# Patient Record
Sex: MALE | Race: WHITE | Employment: UNEMPLOYED | URBAN - METROPOLITAN AREA
[De-identification: names, ages, dates, MRNs, and addresses within clinical notes are randomized per-mention and may not be internally consistent; named-entity substitution may affect disease eponyms.]

---

## 2018-02-06 ENCOUNTER — OFFICE VISIT (OUTPATIENT)
Dept: FAMILY MEDICINE | Facility: CLINIC | Age: 48
End: 2018-02-06

## 2018-02-06 ENCOUNTER — RADIANT APPOINTMENT (OUTPATIENT)
Dept: ULTRASOUND IMAGING | Facility: CLINIC | Age: 48
End: 2018-02-06
Attending: FAMILY MEDICINE

## 2018-02-06 VITALS
DIASTOLIC BLOOD PRESSURE: 64 MMHG | HEART RATE: 60 BPM | RESPIRATION RATE: 16 BRPM | TEMPERATURE: 97.5 F | OXYGEN SATURATION: 100 % | SYSTOLIC BLOOD PRESSURE: 110 MMHG | WEIGHT: 158.8 LBS | HEIGHT: 67 IN | BODY MASS INDEX: 24.92 KG/M2

## 2018-02-06 DIAGNOSIS — N50.89 SCROTAL MASS: Primary | ICD-10-CM

## 2018-02-06 DIAGNOSIS — R30.0 DYSURIA: ICD-10-CM

## 2018-02-06 DIAGNOSIS — Z23 NEED FOR PROPHYLACTIC VACCINATION AND INOCULATION AGAINST INFLUENZA: ICD-10-CM

## 2018-02-06 DIAGNOSIS — R39.15 URINARY URGENCY: ICD-10-CM

## 2018-02-06 DIAGNOSIS — N50.89 SCROTAL MASS: ICD-10-CM

## 2018-02-06 DIAGNOSIS — F17.200 TOBACCO USE DISORDER: ICD-10-CM

## 2018-02-06 LAB
ALBUMIN UR-MCNC: NEGATIVE MG/DL
APPEARANCE UR: CLEAR
BILIRUB UR QL STRIP: NEGATIVE
COLOR UR AUTO: YELLOW
GLUCOSE UR STRIP-MCNC: NEGATIVE MG/DL
HGB UR QL STRIP: NEGATIVE
KETONES UR STRIP-MCNC: NEGATIVE MG/DL
LEUKOCYTE ESTERASE UR QL STRIP: NEGATIVE
NITRATE UR QL: NEGATIVE
PH UR STRIP: 6.5 PH (ref 5–7)
SOURCE: NORMAL
SP GR UR STRIP: 1.01 (ref 1–1.03)
UROBILINOGEN UR STRIP-ACNC: 0.2 EU/DL (ref 0.2–1)

## 2018-02-06 PROCEDURE — 81003 URINALYSIS AUTO W/O SCOPE: CPT | Performed by: FAMILY MEDICINE

## 2018-02-06 PROCEDURE — 99213 OFFICE O/P EST LOW 20 MIN: CPT | Performed by: FAMILY MEDICINE

## 2018-02-06 PROCEDURE — 76870 US EXAM SCROTUM: CPT | Performed by: RADIOLOGY

## 2018-02-06 ASSESSMENT — ANXIETY QUESTIONNAIRES
4. TROUBLE RELAXING: SEVERAL DAYS
2. NOT BEING ABLE TO STOP OR CONTROL WORRYING: NOT AT ALL
6. BECOMING EASILY ANNOYED OR IRRITABLE: NOT AT ALL
GAD7 TOTAL SCORE: 2
GAD7 TOTAL SCORE: 2
7. FEELING AFRAID AS IF SOMETHING AWFUL MIGHT HAPPEN: NOT AT ALL
7. FEELING AFRAID AS IF SOMETHING AWFUL MIGHT HAPPEN: NOT AT ALL
3. WORRYING TOO MUCH ABOUT DIFFERENT THINGS: SEVERAL DAYS
5. BEING SO RESTLESS THAT IT IS HARD TO SIT STILL: NOT AT ALL
GAD7 TOTAL SCORE: 2
1. FEELING NERVOUS, ANXIOUS, OR ON EDGE: NOT AT ALL

## 2018-02-06 ASSESSMENT — PAIN SCALES - GENERAL: PAINLEVEL: SEVERE PAIN (7)

## 2018-02-06 ASSESSMENT — PATIENT HEALTH QUESTIONNAIRE - PHQ9
10. IF YOU CHECKED OFF ANY PROBLEMS, HOW DIFFICULT HAVE THESE PROBLEMS MADE IT FOR YOU TO DO YOUR WORK, TAKE CARE OF THINGS AT HOME, OR GET ALONG WITH OTHER PEOPLE: NOT DIFFICULT AT ALL
SUM OF ALL RESPONSES TO PHQ QUESTIONS 1-9: 5
SUM OF ALL RESPONSES TO PHQ QUESTIONS 1-9: 5

## 2018-02-06 NOTE — PROGRESS NOTES
SUBJECTIVE:   Matthew Lawler is a 47 year old male who presents to clinic today for the following health issues:      History of Present Illness     Diet:  Other  Frequency of exercise:  None  Taking medications regularly:  Yes  Medication side effects:  Not applicable  Additional concerns today:  No    Concern - Lump in scrotum--Noted the irregularity in the left scrotal sac by chance.  There has been no pain or discomfort.  He is over 10 years post vasectomy which had no complications.  No apparent urinary changes other than a feeling of urgency over the past week and slight burning.  Denies any sexual contact that may have caused STDs.  There is no frequency.     Onset: Just noticed this morning    Description:   Lump in scrotum    Intensity: moderate    Progression of Symptoms:  worsening    Accompanying Signs & Symptoms:  Not painful, having issue with urination    Previous history of similar problem:   None     Precipitating factors:   Worsened by: None    Alleviating factors:  Improved by: None    Therapies Tried and outcome: None, just discovered this morning.   Problem list and histories reviewed & adjusted, as indicated.  Additional history: as documented            ROS:  C: NEGATIVE for fever, chills, change in weight  E: NEGATIVE for vision changes or irritation  E/M: NEGATIVE for ear, mouth and throat problems  R: NEGATIVE for significant cough or SOB  CV: NEGATIVE for chest pain, palpitations or peripheral edema  GI: NEGATIVE for nausea, abdominal pain, heartburn, or change in bowel habits   male : Symptoms as noted above  MUSCULOSKELETAL: Chronic lower back pain, followed through pain management.  N: NEGATIVE for weakness, dizziness or paresthesias  E: NEGATIVE for temperature intolerance, skin/hair changes  H: NEGATIVE for bleeding problems  P: NEGATIVE for changes in mood or affect    OBJECTIVE:                                                    /64  Pulse 60  Temp 97.5  F (36.4  C)  "(Temporal)  Resp 16  Ht 5' 6.73\" (1.695 m)  Wt 158 lb 12.8 oz (72 kg)  SpO2 100%  BMI 25.07 kg/m2  Body mass index is 25.07 kg/(m^2).  GENERAL: alert, active, no distress and cooperative  HENT: ear canals- normal; TMs- normal; Nose- normal; Mouth- no ulcers, no lesions  NECK: no tenderness, no adenopathy, no asymmetry, no masses, no stiffness; thyroid- normal to palpation  RESP: lungs clear to auscultation - no rales, no rhonchi, no wheezes  CV: regular rates and rhythm, normal S1 S2, no S3 or S4 and no murmur, no click or rub -  ABDOMEN: soft, no tenderness, no  hepatosplenomegaly, no masses, normal bowel sounds  MS: extremities- no gross deformities noted, no edema  NEURO: strength and tone- normal, sensory exam- grossly normal, mentation- intact, speech- normal, reflexes- symmetric  - male: testicles- normal, no atrophy, no masses;  no inguinal hernias  - male: There appears to be approximately 1-2 cm smooth mass likely located in the spermatic cord just superior to the testes.  Likely some type of benign cyst.  Ultrasound is pending.  PSYCH: Alert and oriented times 3; speech- coherent , normal rate and volume; able to articulate logical thoughts, able to abstract reason, no tangential thoughts, no hallucinations or delusions, affect- normal    Diagnostic test results:  Diagnostic Test Results:  Results for orders placed or performed in visit on 02/06/18 (from the past 24 hour(s))   *UA reflex to Microscopic   Result Value Ref Range    Color Urine Yellow     Appearance Urine Clear     Glucose Urine Negative NEG^Negative mg/dL    Bilirubin Urine Negative NEG^Negative    Ketones Urine Negative NEG^Negative mg/dL    Specific Gravity Urine 1.015 1.003 - 1.035    Blood Urine Negative NEG^Negative    pH Urine 6.5 5.0 - 7.0 pH    Protein Albumin Urine Negative NEG^Negative mg/dL    Urobilinogen Urine 0.2 0.2 - 1.0 EU/dL    Nitrite Urine Negative NEG^Negative    Leukocyte Esterase Urine Negative NEG^Negative    " Source Midstream Urine         ASSESSMENT/PLAN:                                                    1. Tobacco use disorder  Encouraged to discontinue all tobacco products.  - TOBACCO CESSATION ORDER FOR     2. Need for prophylactic vaccination and inoculation against influenza  Advised to still obtain an influenza vaccination.  Can obtain this at a pharmacy at a reduced cost.    3. Urinary urgency  No indication of infection.  Denies any sexual contact for STDs.  - *UA reflex to Microscopic    4. Dysuria  Negative urine.  Denies any STD potential.  - *UA reflex to Microscopic    5. Scrotal mass  Checking an ultrasound but likely benign.  - US Testicular and Scrotum; Future      Follow up with Provider -Follow-up as needed.  See Patient Instructions    The patient understood the rational for the diagnosis and treatment plan. All questions were answered to best of my ability and the patient's satisfaction.    Note: Chart documentation done in part with Dragon Voice Recognition software. Although reviewed after completion, some word and grammatical errors may remain.  Please consider this when interpreting information in this chart.      Josh Serna MD  [facility]  Answers for HPI/ROS submitted by the patient on 2/6/2018   PHQ-2 Score: 3  If you checked off any problems, how difficult have these problems made it for you to do your work, take care of things at home, or get along with other people?: Not difficult at all  PHQ9 TOTAL SCORE: 5  ISHMAEL 7 TOTAL SCORE: 2

## 2018-02-06 NOTE — MR AVS SNAPSHOT
After Visit Summary   2/6/2018    Matthew Lawler    MRN: [id number]           Patient Information     Date Of Birth          1970        Visit Information        Provider Department      2/6/2018 10:40 AM Josh Serna MD [facility]        Today's Diagnoses     Scrotal mass    -  1    Tobacco use disorder        Need for prophylactic vaccination and inoculation against influenza        Urinary urgency        Dysuria           Follow-ups after your visit        Follow-up notes from your care team     Return if symptoms worsen or fail to improve.      Your next 10 appointments already scheduled     Feb 06, 2018  5:50 PM CST   US TESTICULAR AND SCROTUM with MGUS1, [facility] [place]   [facility]    [id number] [place] [id number] [phone]           Please bring a list of your medicines (including vitamins, minerals and over-the-counter drugs). Also, tell your doctor about any allergies you may have. Wear comfortable clothes and leave your valuables at home.  You do not need to do anything special to prepare for your exam.  Please call the Imaging Department at your exam site with any questions.              Future tests that were ordered for you today     Open Future Orders        Priority Expected Expires Ordered    US Testicular and Scrotum Routine 2/6/2018 3/29/2018 2/6/2018            Who to contact     If you have questions or need follow up information about today's clinic visit or your schedule please contact [facility] directly at [phone].  Normal or non-critical lab and imaging results will be communicated to you by MyChart, letter or phone within 4 business days after the clinic has received the results. If you do not hear from us within 7 days, please contact the clinic through MyChart or phone. If you have a critical or abnormal lab result, we will notify you by phone as soon as  "possible.  Submit refill requests through [employer] or call your pharmacy and they will forward the refill request to us. Please allow 3 business days for your refill to be completed.          Additional Information About Your Visit        Korb[employer]art Information     [employer]eper gives you secure access to your electronic health record. If you see a primary care provider, you can also send messages to your care team and make appointments. If you have questions, please call your primary care clinic.  If you do not have a primary care provider, please call [phone] and they will assist you.        Care EveryWhere ID     This is your Care EveryWhere ID. This could be used by other organizations to access your [place] medical records  [id number]        Your Vitals Were     Pulse Temperature Respirations Height Pulse Oximetry BMI (Body Mass Index)    60 97.5  F (36.4  C) (Temporal) 16 5' 6.73\" (1.695 m) 100% 25.07 kg/m2       Blood Pressure from Last 3 Encounters:   02/06/18 110/64   12/05/16 152/80   10/04/16 108/70    Weight from Last 3 Encounters:   02/06/18 158 lb 12.8 oz (72 kg)   12/05/16 161 lb 1.6 oz (73.1 kg)   10/04/16 163 lb (73.9 kg)              We Performed the Following     *UA reflex to Microscopic     TOBACCO CESSATION ORDER FOR         Primary Care Provider Office Phone # Fax #    Josh Serna [phone] [address] [facility] [address]        Equal Access to Services     [facility] : Hadii aad ku hadasho Soomaali, waaxda luqadaha, qaybta kaalmada adeegyada, wax radha frideman adejeronimo garrido . So [facility] [phone].    ATENCIÓN: Si habla español, tiene a gamboa disposición servicios gratuitos de asistencia lingüística. Llame al [phone].    We comply with applicable federal civil rights laws and [place] laws. We do not discriminate on the basis of race, color, national origin, age, disability, sex, sexual orientation, or gender identity.            Thank you! "     Thank you for choosing [facility]  for your care. Our goal is always to provide you with excellent care. Hearing back from our patients is one way we can continue to improve our services. Please take a few minutes to complete the written survey that you may receive in the mail after your visit with us. Thank you!             Your Updated Medication List - Protect others around you: Learn how to safely use, store and throw away your medicines at www.disposemymeds.org.          This list is accurate as of 2/6/18 11:44 AM.  Always use your most recent med list.                   Brand Name Dispense Instructions for use Diagnosis    * ALPRAZolam 0.5 MG tablet    XANAX    30 tablet    Take 1 tablet (0.5 mg) by mouth nightly as needed for anxiety    Anxiety       * ALPRAZolam 2 MG tablet    XANAX    30 tablet    Take 1 tablet (2 mg) by mouth nightly as needed for sleep    Anxiety       methadone 10 MG tablet    DOLOPHINE    120 tablet    Take 2 tablets (20 mg) by mouth every 12 hours    Chronic pain syndrome       oxyCODONE IR 15 MG tablet    ROXICODONE    90 tablet    Take 1 tablet (15 mg) by mouth 3 times daily    Chronic pain syndrome       * Notice:  This list has 2 medication(s) that are the same as other medications prescribed for you. Read the directions carefully, and ask your doctor or other care provider to review them with you.

## 2018-02-07 ENCOUNTER — TELEPHONE (OUTPATIENT)
Dept: FAMILY MEDICINE | Facility: CLINIC | Age: 48
End: 2018-02-07

## 2018-02-07 DIAGNOSIS — N50.89 SCROTAL MASS: Primary | ICD-10-CM

## 2018-02-07 ASSESSMENT — ANXIETY QUESTIONNAIRES: GAD7 TOTAL SCORE: 2

## 2018-02-07 ASSESSMENT — PATIENT HEALTH QUESTIONNAIRE - PHQ9: SUM OF ALL RESPONSES TO PHQ QUESTIONS 1-9: 5

## 2018-02-08 NOTE — TELEPHONE ENCOUNTER
Matthew was called to inform him of the scrotal ultrasound results.  Radiologist read a heterogeneous mass in the left spermatic cord region.  The lesion was not able to be confirmed as a benign lesion.  Tumor was possible but highly unlikely.    Recommendation is for urologic consultation with Dr. Li in [place].  Referral placed.    Please assist in scheduling with Dr. Li and inform the patient. The patient understood the rational for the diagnosis and treatment plan. All questions were answered to best of my ability and the patient's satisfaction.      Josh Serna MD  Please close encounter when call/work completed.

## 2018-02-15 ENCOUNTER — TELEPHONE (OUTPATIENT)
Dept: UROLOGY | Facility: CLINIC | Age: 48
End: 2018-02-15

## 2018-02-15 NOTE — TELEPHONE ENCOUNTER
This cma is leaving a mychart message for previsit, not able to leave Vmail for previsit.     Amorrae Lisa CMA

## 2018-02-28 ENCOUNTER — OFFICE VISIT (OUTPATIENT)
Dept: UROLOGY | Facility: CLINIC | Age: 48
End: 2018-02-28
Attending: FAMILY MEDICINE

## 2018-02-28 VITALS — HEART RATE: 66 BPM | OXYGEN SATURATION: 98 % | SYSTOLIC BLOOD PRESSURE: 104 MMHG | DIASTOLIC BLOOD PRESSURE: 62 MMHG

## 2018-02-28 DIAGNOSIS — N50.89 SCROTAL MASS: ICD-10-CM

## 2018-02-28 PROCEDURE — 99203 OFFICE O/P NEW LOW 30 MIN: CPT | Performed by: UROLOGY

## 2018-02-28 ASSESSMENT — PAIN SCALES - GENERAL: PAINLEVEL: NO PAIN (0)

## 2018-02-28 NOTE — NURSING NOTE
"Matthew Lawler's goals for this visit include:   Chief Complaint   Patient presents with     Consult     Scrotal lump, left side       He requests these members of his care team be copied on today's visit information: Yes    PCP: Josh Serna    Referring Provider:  Josh Serna MD  [address] [place] [address]    Chief Complaint   Patient presents with     Consult     Scrotal lump, left side       Initial /62 (BP Location: Left arm, Patient Position: Sitting, Cuff Size: Adult Regular)  Pulse 66  SpO2 98% Estimated body mass index is 25.07 kg/(m^2) as calculated from the following:    Height as of 2/6/18: 1.695 m (5' 6.73\").    Weight as of 2/6/18: 72 kg (158 lb 12.8 oz).  Medication Reconciliation: complete    Do you need any medication refills at today's visit? No    "

## 2018-02-28 NOTE — MR AVS SNAPSHOT
After Visit Summary   2/28/2018    Matthew Lawler    MRN: [id number]           Patient Information     Date Of Birth          1970        Visit Information        Provider Department      2/28/2018 2:30 PM Jed Li MD [facility]        Today's Diagnoses     Scrotal mass          Care Instructions    Please call [phone] to schedule ultrasound around 5/28/18.          Follow-ups after your visit        Future tests that were ordered for you today     Open Future Orders        Priority Expected Expires Ordered    US Testicular and Scrotum Routine 5/28/2018 2/28/2019 2/28/2018            Who to contact     If you have questions or need follow up information about today's clinic visit or your schedule please contact [facility] directly at [phone].  Normal or non-critical lab and imaging results will be communicated to you by [employer], letter or phone within 4 business days after the clinic has received the results. If you do not hear from us within 7 days, please contact the clinic through [employer] or phone. If you have a critical or abnormal lab result, we will notify you by phone as soon as possible.  Submit refill requests through [employer] or call your pharmacy and they will forward the refill request to us. Please allow 3 business days for your refill to be completed.          Additional Information About Your Visit        [employer]art Information     [employer] gives you secure access to your electronic health record. If you see a primary care provider, you can also send messages to your care team and make appointments. If you have questions, please call your primary care clinic.  If you do not have a primary care provider, please call [phone] and they will assist you.      [employer] is an electronic gateway that provides easy, online access to your medical records. With [employer], you can request a clinic appointment, read your test results, renew a  prescription or communicate with your care team.     To access your existing account, please contact your [facility] or call [phone] for assistance.        Care EveryWhere ID     This is your Care EveryWhere ID. This could be used by other organizations to access your [facility] medical records  [id number]        Your Vitals Were     Pulse Pulse Oximetry                66 98%           Blood Pressure from Last 3 Encounters:   02/28/18 104/62   02/06/18 110/64   12/05/16 152/80    Weight from Last 3 Encounters:   02/06/18 72 kg (158 lb 12.8 oz)   12/05/16 73.1 kg (161 lb 1.6 oz)   10/04/16 73.9 kg (163 lb)              We Performed the Following     UROLOGY ADULT REFERRAL        Primary Care Provider Office Phone # Fax #    Josh Serna [phone] [address] [facility] [address]        Equal Access to Services     ELZA GRIFFIN : Hadii aad ku hadasho Soomaali, waaxda luqadaha, qaybta kaalmada adeegyada, waxay marcin haycassidyn jesus garrido . So [facility] [phone].    ATENCIÓN: Si habla español, tiene a gamboa disposición servicios gratuitos de asistencia lingüística. Llame al [phone].    We comply with applicable federal civil rights laws and [place] laws. We do not discriminate on the basis of race, color, national origin, age, disability, sex, sexual orientation, or gender identity.            Thank you!     Thank you for choosing [facility]  for your care. Our goal is always to provide you with excellent care. Hearing back from our patients is one way we can continue to improve our services. Please take a few minutes to complete the written survey that you may receive in the mail after your visit with us. Thank you!             Your Updated Medication List - Protect others around you: Learn how to safely use, store and throw away your medicines at www.disposemymeds.org.          This list is accurate as of 2/28/18   2:42 PM.  Always use your most recent med list.                   Brand Name Dispense Instructions for use Diagnosis    methadone 10 MG tablet    DOLOPHINE    120 tablet    Take 2 tablets (20 mg) by mouth every 12 hours    Chronic pain syndrome       oxyCODONE IR 15 MG tablet    ROXICODONE    90 tablet    Take 1 tablet (15 mg) by mouth 3 times daily    Chronic pain syndrome

## 2018-02-28 NOTE — PROGRESS NOTES
I am seeing Matthew Lawler in consultation from Josh Serna  for evaluation of left scrotal mass.    HPI:  Matthew Lawler is a 47 year old male who recently noticed a lump behind the left testis.  This does not hurt but can be sensitive to touch.  He is status-post vasectomy about 2004.  No injury.  No infection.  Otherwise without  complaints.    REVIEW OF SYSTEMS:  General: negative  Skin: negative  Eyes: negative  Ears/Nose/Throat: negative  Respiratory: negative  Cardiovascular: negative  Gastrointestinal: negative  Genitourinary: see HPI  Musculoskeletal: negative  Neurologic: negative  Psychiatric: negative  Hematologic/Lymphatic/Immunologic: negative  Endocrine: negative    PAST MEDICAL HX:  Past Medical History:   Diagnosis Date     Anxiety      Chronic pain      COPD (chronic obstructive pulmonary disease) (H)      Degeneration of lumbar or lumbosacral intervertebral disc     L4/L5 and L5/S1 --work injury       PAST SURG HX:  Past Surgical History:   Procedure Laterality Date     INJECT EPIDURAL LUMBAR  06/16/2014    [facility]     SURGICAL HISTORY OF -       Bilateral carpal tunnel release     SURGICAL HISTORY OF -       Tendon repair right elbow        FAMILY HX:  Family History   Problem Relation Age of Onset     CANCER Father      bladder     Asthma No family hx of      C.A.D. No family hx of      DIABETES No family hx of      Hypertension No family hx of      CEREBROVASCULAR DISEASE No family hx of      Breast Cancer No family hx of      Cancer - colorectal No family hx of      Prostate Cancer No family hx of      Alcohol/Drug No family hx of      Anesthesia Reaction No family hx of      Arthritis No family hx of      Alzheimer Disease No family hx of      Allergies No family hx of      Blood Disease No family hx of      Cardiovascular No family hx of      Circulatory No family hx of      Congenital Anomalies No family hx of      Connective Tissue Disorder No family hx of      Endocrine  Disease No family hx of      Depression No family hx of      Eye Disorder No family hx of      Genetic Disorder No family hx of      GASTROINTESTINAL DISEASE No family hx of      Genitourinary Problems No family hx of      Gynecology No family hx of      HEART DISEASE No family hx of      Lipids No family hx of      Musculoskeletal Disorder No family hx of      Neurologic Disorder No family hx of      Obesity No family hx of      OSTEOPOROSIS No family hx of      Psychotic Disorder No family hx of      Respiratory No family hx of      Thyroid Disease No family hx of      Hearing Loss No family hx of      Family History Negative No family hx of      Coronary Artery Disease No family hx of      Hyperlipidemia No family hx of      Ovarian Cancer No family hx of      Depression/Anxiety No family hx of      Thyroid Disease No family hx of      Chemical Addiction No family hx of      Known Genetic Syndrome No family hx of      Anxiety Disorder No family hx of      MENTAL ILLNESS No family hx of      Substance Abuse No family hx of      Other Cancer No family hx of      Colon Cancer No family hx of        SOCIAL HX:  Social History   Substance Use Topics     Smoking status: Current Every Day Smoker     Packs/day: 0.50     Years: 19.00     Types: Cigarettes     Smokeless tobacco: Never Used      Comment: 1/2 pack per day     Alcohol use No       MEDICATIONS:  Current Outpatient Prescriptions   Medication Sig     methadone (DOLOPHINE) 10 MG tablet Take 2 tablets (20 mg) by mouth every 12 hours     oxyCODONE (ROXICODONE) 15 MG immediate release tablet Take 1 tablet (15 mg) by mouth 3 times daily     No current facility-administered medications for this visit.        ALLERGIES:  Penicillins and Morphine hcl      GENERAL PHYSICAL EXAM:     /62 (BP Location: Left arm, Patient Position: Sitting, Cuff Size: Adult Regular)  Pulse 66  SpO2 98%   Constitutional: No acute distress. Well nourished.   PSYCH: normal mood and  affect.  NEURO: normal gait, no focal deficits.   EYES: anicteric, EOMI, PERR.  CARDIOPULMONARY: breathing non-labored, pulse regular rate/rhythm, no peripheral edema.  GI: Abdomen soft, nondistended   MUSCULOSKELETAL: normal limb proportions, no muscle wasting, no contractures.  SKIN: Normal virilized hair distribution, no lesions, warts or rashes over genitalia, abdomen extremities or face.  HEME/LYMPH: no ecchymosis, no lymphadenopathy in groin, no lymphedema.     EXAM:  Phallus uncircumcised,   Left testis descended , size is 15 , consistency is slightly soft . No intra-testicular masses.   Right testis descended , size is 15 , consistency is slightly soft . No intra-testicular masses.   Right epididymis present, non-tender, slightly indurated consistent with post vasectomy.  Left epididymis present, slightly indurated in body, nontender.  There is a 8mm mass near the left epididymis tail.  This is slightly tender to palpation.   Cord structures not remarkable.     Prostate exam: deferred     Imaging/labs:  Lab Results   Component Value Date    CR 0.99 09/28/2015       ASSESSMENT:     Left epididymis mass.  Differential diagnosis is sperm granuloma or less likely adenomatoid tumor.  Discussed that malignancy is possible but very rare to see in the epididymis.    PLAN:  Orders Placed This Encounter     US Testicular and Scrotum         Advised follow-up with  scrotal ultrasound in 3 months and return to clinic to discuss results in depth.      Copied cc to Consulting provider Josh Serna        Thank-you for the kind consultation.  Jed Li MD     Urological Surgeon

## 2019-03-11 ENCOUNTER — TELEPHONE (OUTPATIENT)
Dept: UROLOGY | Facility: CLINIC | Age: 49
End: 2019-03-11

## 2019-03-11 NOTE — TELEPHONE ENCOUNTER
Received alert that patient is overdue for testicular ultrasound as recommended by Dr. Li on 2/28/18. Attempted to reach patient by phone, but no answer. Left generic message with request for patient to return call to clinic. When patient returns call, follow up testicular ultrasound can be scheduled with imaging.    Rupal Mackey RN, BSN

## 2019-03-15 NOTE — TELEPHONE ENCOUNTER
Attempted to reach patient by phone, but no answer and unable to leave message as voicemail box is full. Will continue to try to reach patient.    Rupal Mackey RN, BSN

## 2019-03-19 NOTE — TELEPHONE ENCOUNTER
Attempted to reach patient by phone, but no answer and unable to leave message as voicemail box is full. Letter mailed to patient's home address on file.    Rupal Mackey RN, BSN

## 2019-04-03 ENCOUNTER — MYC REFILL (OUTPATIENT)
Dept: FAMILY MEDICINE | Facility: CLINIC | Age: 49
End: 2019-04-03

## 2019-04-03 DIAGNOSIS — G89.4 CHRONIC PAIN SYNDROME: ICD-10-CM

## 2019-04-03 RX ORDER — METHADONE HYDROCHLORIDE 10 MG/1
20 TABLET ORAL EVERY 12 HOURS
Qty: 120 TABLET | Refills: 0 | Status: CANCELLED | OUTPATIENT
Start: 2019-04-03

## 2019-04-03 NOTE — TELEPHONE ENCOUNTER
Reviewed chart and reviewed . It has been over a year since he was in this clinic and three years since this medication has been filled here.  He will need to schedule an appointment.

## 2019-04-04 ENCOUNTER — MYC REFILL (OUTPATIENT)
Dept: FAMILY MEDICINE | Facility: CLINIC | Age: 49
End: 2019-04-04

## 2019-04-04 DIAGNOSIS — G89.4 CHRONIC PAIN SYNDROME: ICD-10-CM

## 2019-04-04 NOTE — TELEPHONE ENCOUNTER
Please see messages below. Patient is reporting symptoms of withdrawal.      RN please triage. May need ED evaluation.

## 2019-04-04 NOTE — TELEPHONE ENCOUNTER
(Pt sent separate [employer] message back - copy/pasted here to continue conversation)    Hi I'm replying to the message regarding medication, I called [facility] and they dont have an appointment for two weeks.  I'm already having withdrawal symptoms,  is there anything you can do to get me in?

## 2019-04-08 RX ORDER — METHADONE HYDROCHLORIDE 10 MG/1
20 TABLET ORAL EVERY 12 HOURS
Qty: 120 TABLET | Refills: 0 | OUTPATIENT
Start: 2019-04-08

## 2019-04-08 NOTE — TELEPHONE ENCOUNTER
Requested Prescriptions   Pending Prescriptions Disp Refills     methadone (DOLOPHINE) 10 MG tablet 120 tablet 0     Sig: Take 2 tablets (20 mg) by mouth every 12 hours       There is no refill protocol information for this order        Last ov 02/06/2018    Routing refill request to provider for review/approval because:  Drug not on the [facility] refill protocol   Patient needs to be seen because it has been more than 1 year since last office visit.

## 2019-07-01 ENCOUNTER — TELEPHONE (OUTPATIENT)
Dept: UROLOGY | Facility: CLINIC | Age: 49
End: 2019-07-01

## 2019-07-01 NOTE — TELEPHONE ENCOUNTER
Left message for patient to return call to clinic. Needs to get scheduled and is overdue for his testicular and scrotal ultrasound per Dr Li. Awaiting patient 's response. Mansi Abdullahi RN

## 2019-07-02 NOTE — TELEPHONE ENCOUNTER
Attempted to contact pt multiple times to schedule overdue testicular ultrasound with no success. Letter mailed to pt's home address as well. Order canceled and encounter being closed at this time.    Evelyn Stoner LPN

## 2020-03-02 ENCOUNTER — HEALTH MAINTENANCE LETTER (OUTPATIENT)
Age: 50
End: 2020-03-02

## 2020-12-20 ENCOUNTER — HEALTH MAINTENANCE LETTER (OUTPATIENT)
Age: 50
End: 2020-12-20

## 2021-03-08 ENCOUNTER — VIRTUAL VISIT (OUTPATIENT)
Dept: FAMILY MEDICINE | Facility: CLINIC | Age: 51
End: 2021-03-08

## 2021-03-08 DIAGNOSIS — F41.9 ANXIETY: Primary | ICD-10-CM

## 2021-03-08 PROBLEM — Z12.11 SCREEN FOR COLON CANCER: Status: ACTIVE | Noted: 2021-03-08

## 2021-03-08 PROCEDURE — 99213 OFFICE O/P EST LOW 20 MIN: CPT | Mod: 95 | Performed by: FAMILY MEDICINE

## 2021-03-08 PROCEDURE — 96127 BRIEF EMOTIONAL/BEHAV ASSMT: CPT | Performed by: FAMILY MEDICINE

## 2021-03-08 RX ORDER — ALPRAZOLAM 2 MG
TABLET ORAL
Qty: 21 TABLET | Refills: 0 | Status: SHIPPED | OUTPATIENT
Start: 2021-03-08 | End: 2021-03-29

## 2021-03-08 ASSESSMENT — ANXIETY QUESTIONNAIRES
3. WORRYING TOO MUCH ABOUT DIFFERENT THINGS: NOT AT ALL
1. FEELING NERVOUS, ANXIOUS, OR ON EDGE: SEVERAL DAYS
2. NOT BEING ABLE TO STOP OR CONTROL WORRYING: NOT AT ALL
6. BECOMING EASILY ANNOYED OR IRRITABLE: NOT AT ALL
GAD7 TOTAL SCORE: 5
5. BEING SO RESTLESS THAT IT IS HARD TO SIT STILL: SEVERAL DAYS
IF YOU CHECKED OFF ANY PROBLEMS ON THIS QUESTIONNAIRE, HOW DIFFICULT HAVE THESE PROBLEMS MADE IT FOR YOU TO DO YOUR WORK, TAKE CARE OF THINGS AT HOME, OR GET ALONG WITH OTHER PEOPLE: NOT DIFFICULT AT ALL
7. FEELING AFRAID AS IF SOMETHING AWFUL MIGHT HAPPEN: NOT AT ALL

## 2021-03-08 ASSESSMENT — PATIENT HEALTH QUESTIONNAIRE - PHQ9
SUM OF ALL RESPONSES TO PHQ QUESTIONS 1-9: 8
5. POOR APPETITE OR OVEREATING: NEARLY EVERY DAY

## 2021-03-08 NOTE — PROGRESS NOTES
Matthew is a 50 year old who is being evaluated via a billable video visit.      How would you like to obtain your AVS? Mail a copy  If the video visit is dropped, the invitation should be resent by: Text to cell phone: [phone]  Will anyone else be joining your video visit? No      Video Start Time: 1330    Assessment & Plan     Anxiety  50-year-old male with anxiety, increasing over the past 2 weeks.  Stressors include recently evicted from home, lack of insurance, otherwise feels safe without suicidal ideation.  He does have a history of chronic pain and opioid use.  He is not currently taking any opioids.  Is interested in treating his anxiety.  I advised him that I will need to see him in clinic, given limited amount of alprazolam, that has worked for him in the past.  We will discuss SSRI at that visit with goal of weaning entirely off benzodiazepines.  - ALPRAZolam (XANAX) 2 MG tablet; Take 1/4 tablet by mouth three times a day as needed for anxiety.      Tobacco Cessation:   reports that he has been smoking cigarettes. He has a 9.50 pack-year smoking history. He has never used smokeless tobacco.          Return in about 2 weeks (around 3/22/2021).    Chip Whitlock MD  [facility] KEVIN Castro is a 50 year old who presents for the following health issues     HPI     Has not taken pain meds in several months as they were not working and is now very anxious and would like to restart alprazolam    Anxiety Follow-Up    How are you doing with your anxiety since your last visit? Worsened     Are you having other symptoms that might be associated with anxiety? Yes:  shakes, can't sleep, very jittery    Have you had a significant life event? No     Are you feeling depressed? No    Do you have any concerns with your use of alcohol or other drugs? No    Social History     Tobacco Use     Smoking status: Current Every Day Smoker     Packs/day: 0.50     Years: 19.00     Pack years: 9.50      Types: Cigarettes     Smokeless tobacco: Never Used     Tobacco comment: 1/2 pack per day   Substance Use Topics     Alcohol use: No     Drug use: No     ISHMAEL-7 SCORE 3/25/2016 10/4/2016 2/6/2018   Total Score - - -   Total Score - - 2 (minimal anxiety)   Total Score 7 7 2     PHQ 3/25/2016 10/4/2016 2/6/2018   PHQ-9 Total Score 2 2 5   Q9: Thoughts of better off dead/self-harm past 2 weeks Not at all Not at all Not at all     Last PHQ-9 3/8/2021   1.  Little interest or pleasure in doing things 1   2.  Feeling down, depressed, or hopeless 0   3.  Trouble falling or staying asleep, or sleeping too much 3   4.  Feeling tired or having little energy 0   5.  Poor appetite or overeating 0   6.  Feeling bad about yourself 0   7.  Trouble concentrating 1   8.  Moving slowly or restless 3   Q9: Thoughts of better off dead/self-harm past 2 weeks 0   PHQ-9 Total Score 8   Difficulty at work, home, or with people Not difficult at all     ISHMAEL-7  3/8/2021   1. Feeling nervous, anxious, or on edge 1   2. Not being able to stop or control worrying 0   3. Worrying too much about different things 0   4. Trouble relaxing 3   5. Being so restless that it is hard to sit still 1   6. Becoming easily annoyed or irritable 0   7. Feeling afraid, as if something awful might happen 0   ISHMAEL-7 Total Score 5   If you checked any problems, how difficult have they made it for you to do your work, take care of things at home, or get along with other people? Not difficult at all           Review of Systems   Constitutional, HEENT, cardiovascular, pulmonary, gi and gu systems are negative, except as otherwise noted.      Objective           Vitals:  No vitals were obtained today due to virtual visit.    Physical Exam   GENERAL: Healthy, alert and no distress  EYES: Eyes grossly normal to inspection.  No discharge or erythema, or obvious scleral/conjunctival abnormalities.  RESP: No audible wheeze, cough, or visible cyanosis.  No visible  retractions or increased work of breathing.    SKIN: Visible skin clear. No significant rash, abnormal pigmentation or lesions.  NEURO: Cranial nerves grossly intact.  Mentation and speech appropriate for age.  PSYCH: Mentation appears normal, affect normal/bright, judgement and insight intact, normal speech and appearance well-groomed.            Video-Visit Details    Type of service:  Video Visit    Video End Time:1341    Originating Location (pt. Location): Home    Distant Location (provider location):  [facility]     Platform used for Video Visit: [employer]

## 2021-03-09 ASSESSMENT — ANXIETY QUESTIONNAIRES: GAD7 TOTAL SCORE: 5

## 2021-03-29 ENCOUNTER — MYC MEDICAL ADVICE (OUTPATIENT)
Dept: FAMILY MEDICINE | Facility: CLINIC | Age: 51
End: 2021-03-29

## 2021-03-29 ENCOUNTER — MYC REFILL (OUTPATIENT)
Dept: FAMILY MEDICINE | Facility: CLINIC | Age: 51
End: 2021-03-29

## 2021-03-29 DIAGNOSIS — F41.9 ANXIETY: ICD-10-CM

## 2021-03-29 NOTE — TELEPHONE ENCOUNTER
Pending Prescriptions:                       Disp   Refills    ALPRAZolam (XANAX) 2 MG tablet             21 tab*0        Sig: Take 1/4 tablet by mouth three times a day as needed           for anxiety.    Routing refill request to provider for review/approval because:  Drug not on the [facility] refill protocol          Source: Patient [X]  Family [ ]   other [ ]    Current Diet: Dysphagia 1, Pureed - Thin Liquids, Consistent CHO, No Snacks    Patient reports [ ] nausea  [ ] vomiting [ ] diarrhea [ ] constipation  [ ]chewing problems [ ] swallowing issues  [ ] other:     PO intake:  < 50% [ ]   50-75%  [ ]   %  [X]  other :    Source for PO intake [ ] Patient [ ] family [X] chart [ ] staff [ ] other    Enteral /Parenteral Nutrition:     Current Weight: 143.5 (12-26-17)    % Weight Change     Pertinent Medications: MEDICATIONS  (STANDING):  amLODIPine   Tablet 10 milliGRAM(s) Oral daily  atorvastatin 40 milliGRAM(s) Oral at bedtime  cefTRIAXone   IVPB      cefTRIAXone   IVPB 1 Gram(s) IV Intermittent every 24 hours  cinacalcet 30 milliGRAM(s) Oral two times a day  dextrose 5%. 1000 milliLiter(s) (50 mL/Hr) IV Continuous <Continuous>  dextrose 50% Injectable 12.5 Gram(s) IV Push once  dextrose 50% Injectable 25 Gram(s) IV Push once  dextrose 50% Injectable 25 Gram(s) IV Push once  diVALproex  milliGRAM(s) Oral every 12 hours  hydrALAZINE 50 milliGRAM(s) Oral every 8 hours  insulin glargine Injectable (LANTUS) 25 Unit(s) SubCutaneous at bedtime  insulin lispro (HumaLOG) corrective regimen sliding scale   SubCutaneous Before meals and at bedtime  insulin lispro Injectable (HumaLOG) 2 Unit(s) SubCutaneous three times a day before meals  levothyroxine 75 MICROGram(s) Oral daily  pantoprazole    Tablet 40 milliGRAM(s) Oral before breakfast  propranolol 40 milliGRAM(s) Oral two times a day  QUEtiapine 50 milliGRAM(s) Oral at bedtime  QUEtiapine 25 milliGRAM(s) Oral two times a day  sucralfate 1 Gram(s) Oral Before meals and at bedtime  tamsulosin 0.4 milliGRAM(s) Oral at bedtime    MEDICATIONS  (PRN):  dextrose Gel 1 Dose(s) Oral once PRN Blood Glucose LESS THAN 70 milliGRAM(s)/deciliter  glucagon  Injectable 1 milliGRAM(s) IntraMuscular once PRN Glucose LESS THAN 70 milligrams/deciliter  hydrALAZINE Injectable 10 milliGRAM(s) IV Push every 8 hours PRN if sbp >170 or dbp >100    Pertinent Labs: CBC Full  -  ( 02 Jan 2018 08:54 )  WBC Count : 10.6 K/uL  Hemoglobin : 11.2 g/dL  Hematocrit : 35.5 %  Platelet Count - Automated : 193 K/uL  Mean Cell Volume : 91.7 fl  Mean Cell Hemoglobin : 28.9 pg  Mean Cell Hemoglobin Concentration : 31.5 g/dL  Auto Neutrophil # : 7.3 K/uL  Auto Lymphocyte # : 1.9 K/uL  Auto Monocyte # : 1.1 K/uL  Auto Eosinophil # : 0.1 K/uL  Auto Basophil # : 0.0 K/uL  Auto Neutrophil % : 69.3 %  Auto Lymphocyte % : 17.5 %  Auto Monocyte % : 10.7 %  Auto Eosinophil % : 1.2 %  Auto Basophil % : 0.1 %    Skin: Blisters present    Nutrition focused physical exam conducted - found signs of malnutrition [X]absent [ ]present    Subcutaneous fat loss: [ ] Orbital fat pads region, [ ]Buccal fat region, [ ]Triceps region,  [ ]Ribs region    Muscle wasting: [ ]Temples region, [ ]Clavicle region, [ ]Shoulder region, [ ]Scapula region, [ ]Interosseous region,  [ ]thigh region, [ ]Calf region    Estimated Needs:   [X] no change since previous assessment  [ ] recalculated:     Current Nutrition Diagnosis: Swallowing difficulty; Biting/Chewing (masticatory) difficulty related to recent functional impairments as evidenced by modified diet consistency    Recommendations:   1) Continue current diet order   2) Defer diet consistency to SLP    Monitoring and Evaluation:   [X] PO intake [X] Tolerance to diet prescription [X] Weights  [X] Follow up per protocol [X] Labs:

## 2021-03-30 RX ORDER — ALPRAZOLAM 2 MG
TABLET ORAL
Qty: 10 TABLET | Refills: 0 | Status: SHIPPED | OUTPATIENT
Start: 2021-03-30 | End: 2021-03-30

## 2021-04-01 ENCOUNTER — MYC MEDICAL ADVICE (OUTPATIENT)
Dept: FAMILY MEDICINE | Facility: CLINIC | Age: 51
End: 2021-04-01

## 2021-04-01 DIAGNOSIS — F41.9 ANXIETY: Primary | ICD-10-CM

## 2021-04-05 ENCOUNTER — MYC MEDICAL ADVICE (OUTPATIENT)
Dept: FAMILY MEDICINE | Facility: CLINIC | Age: 51
End: 2021-04-05

## 2021-04-05 ENCOUNTER — TELEPHONE (OUTPATIENT)
Dept: FAMILY MEDICINE | Facility: CLINIC | Age: 51
End: 2021-04-05

## 2021-04-05 RX ORDER — ALPRAZOLAM 2 MG
TABLET ORAL
Qty: 30 TABLET | Refills: 0 | Status: SHIPPED | OUTPATIENT
Start: 2021-04-05 | End: 2021-05-07

## 2021-04-05 NOTE — TELEPHONE ENCOUNTER
Patient Quality Outreach Summary      Summary:    Patient is due/failing the following:   Physical with fasting labs and colonoscopy     Type of outreach:    Sent [employer] message.    Questions for provider review:    None                                                                                                                    Maday Dominguez CMA       Chart routed to Care Team.

## 2021-04-05 NOTE — TELEPHONE ENCOUNTER
Patient is calling back from message on refill medication request.  He states he has applied for [place] health insurance approximately 1 month ago.  He states he does not have a problem scheduling an appointment for establish care/ physical but is waiting for insurance to send approval and information.  Advised patient to schedule office visit towards the end of this month, to have time and research his insurance approval and have insurance cover his scheduled visit.  Patient scheduled office visit for 4/29/2021 with Dr. Kamlesh MD.      Patient is requesting 1 month supply of refill request, as he does not have insurance at this time.  He states he is unable to schedule office visit at this time, but has scheduled for the end of this month, hoping his approval for health care coverage will be approved by that time and covered.    Per 3/8/35333 visit with Dr. Whitlock, documentation stated as follows:  Assessment & Plan   Anxiety  50-year-old male with anxiety, increasing over the past 2 weeks.  Stressors include recently evicted from home, lack of insurance, otherwise feels safe without suicidal ideation.  He does have a history of chronic pain and opioid use.  He is not currently taking any opioids.  Is interested in treating his anxiety.  I advised him that I will need to see him in clinic, given limited amount of alprazolam, that has worked for him in the past.  We will discuss SSRI at that visit with goal of weaning entirely off benzodiazepines.  - ALPRAZolam (XANAX) 2 MG tablet; Take 1/4 tablet by mouth three times a day as needed for anxiety.     Will forward to provider requested  Please review.  Thank you    Urszula Tran RN

## 2021-04-05 NOTE — TELEPHONE ENCOUNTER
Spoke with patient he does not have insurance at this time, he states that he has applied with [place] [employer] but has not heard back he thinks he should get a response soon.   He will call back to schedule.   Thanks  Ruthy Anderson RT (R)

## 2021-04-05 NOTE — TELEPHONE ENCOUNTER
Schedule follow-up virtual visit with me.    Chip Whitlock MD, FAAFP  Family Medicine Physician  [facility] [place]  [address] [place] [address]

## 2021-04-24 ENCOUNTER — HEALTH MAINTENANCE LETTER (OUTPATIENT)
Age: 51
End: 2021-04-24

## 2021-05-06 ASSESSMENT — ANXIETY QUESTIONNAIRES
3. WORRYING TOO MUCH ABOUT DIFFERENT THINGS: SEVERAL DAYS
GAD7 TOTAL SCORE: 5
6. BECOMING EASILY ANNOYED OR IRRITABLE: NOT AT ALL
7. FEELING AFRAID AS IF SOMETHING AWFUL MIGHT HAPPEN: NOT AT ALL
2. NOT BEING ABLE TO STOP OR CONTROL WORRYING: SEVERAL DAYS
GAD7 TOTAL SCORE: 5
7. FEELING AFRAID AS IF SOMETHING AWFUL MIGHT HAPPEN: NOT AT ALL
1. FEELING NERVOUS, ANXIOUS, OR ON EDGE: MORE THAN HALF THE DAYS
5. BEING SO RESTLESS THAT IT IS HARD TO SIT STILL: NOT AT ALL
4. TROUBLE RELAXING: SEVERAL DAYS
GAD7 TOTAL SCORE: 5

## 2021-05-06 ASSESSMENT — PATIENT HEALTH QUESTIONNAIRE - PHQ9
SUM OF ALL RESPONSES TO PHQ QUESTIONS 1-9: 5
SUM OF ALL RESPONSES TO PHQ QUESTIONS 1-9: 5
10. IF YOU CHECKED OFF ANY PROBLEMS, HOW DIFFICULT HAVE THESE PROBLEMS MADE IT FOR YOU TO DO YOUR WORK, TAKE CARE OF THINGS AT HOME, OR GET ALONG WITH OTHER PEOPLE: NOT DIFFICULT AT ALL

## 2021-05-06 NOTE — PROGRESS NOTES
"Assessment & Plan     Anxiety  50-year-old male with long history of anxiety, multiple stressors in his life to include currently having to live in a motel room, without a job, multiple financial stressors.  Have a history of chronic pain was previously on methadone he has not used that for 6 months.  He is currently working on getting insurance assistance.  He has been taking alprazolam chronically.  We discussed the importance of trying different medication.  We will start sertraline.  Patient was given a good Rx coupon for that.  He will follow-up with me in 2 weeks by messaging me.  - ALPRAZolam (XANAX) 2 MG tablet; Take 1/4 tablet 4 times a day as needed for anxiety  - sertraline (ZOLOFT) 100 MG tablet; Take 0.5 tablets (50 mg) by mouth daily    Dental infection  Chronic dental infection, he is awaiting insurance.  No obvious abscess today.  He is allergic to penicillin, will give clindamycin, he was given good Rx coupon for that as well.  - clindamycin (CLEOCIN) 150 MG capsule; Take 1 capsule (150 mg) by mouth 3 times daily for 10 days    Tobacco use disorder  We will address this at a later date.    Financial difficulties  Referral placed to our care coordination to assist with his financial difficulties.  - CARE COORDINATION REFERRAL         BMI:   Estimated body mass index is 26.29 kg/m  as calculated from the following:    Height as of this encounter: 1.682 m (5' 6.22\").    Weight as of this encounter: 74.4 kg (164 lb).       Return in about 4 weeks (around 6/4/2021) for Annual Well Check.    Chip Whitlock MD  [facility] KEVIN GAINES Bucky is a 50 year old male who presents to clinic today for the following health issues:    History of Present Illness       Mental Health Follow-up:  Patient presents to follow-up on Anxiety.    Patient's anxiety since last visit has been:  Good  The patient is having other symptoms associated with anxiety.  Any significant life events: " "job concerns, financial concerns, housing concerns and health concerns  Patient is feeling anxious or having panic attacks.  Patient has no concerns about alcohol or drug use.     Social History  Tobacco Use    Smoking status: Current Every Day Smoker      Packs/day: 0.50      Years: 19.00      Pack years: 9.5      Types: Cigarettes    Smokeless tobacco: Never Used    Tobacco comment: 1/2 pack per day  Alcohol use: No  Drug use: No      Today's PHQ-9         PHQ-9 Total Score:     (P) 5   PHQ-9 Q9 Thoughts of better off dead/self-harm past 2 weeks :   (P) Not at all   Thoughts of suicide or self harm:      Self-harm Plan:        Self-harm Action:          Safety concerns for self or others:           He eats 0-1 servings of fruits and vegetables daily.He consumes 5 sweetened beverage(s) daily.He exercises with enough effort to increase his heart rate 9 or less minutes per day.  He exercises with enough effort to increase his heart rate 3 or less days per week.   He is taking medications regularly.     Answers for HPI/ROS submitted by the patient on 5/6/2021   Chronic problems general questions HPI Form  If you checked off any problems, how difficult have these problems made it for you to do your work, take care of things at home, or get along with other people?: Not difficult at all  PHQ9 TOTAL SCORE: 5  ISHMAEL 7 TOTAL SCORE: 5    Patient is requesting antibiotics until he can get to the dentist. Tooth has been bothering him for a month. He currently working on getting his insurance set up so he can go to the dentist.        Review of Systems   Constitutional, HEENT, cardiovascular, pulmonary, gi and gu systems are negative, except as otherwise noted.      Objective    BP (!) 148/90   Pulse 79   Temp 97.6  F (36.4  C) (Temporal)   Resp 16   Ht 1.682 m (5' 6.22\")   Wt 74.4 kg (164 lb)   SpO2 97%   BMI 26.29 kg/m    Body mass index is 26.29 kg/m .  Physical Exam   GENERAL: healthy, alert and no distress  EYES: " Eyes grossly normal to inspection, PERRL and conjunctivae and sclerae normal  HENT: normal cephalic/atraumatic, ear canals and TM's normal, nose and mouth without ulcers or lesions and poor dentition  NECK: no adenopathy, no asymmetry, masses, or scars and thyroid normal to palpation  RESP: lungs clear to auscultation - no rales, rhonchi or wheezes  CV: regular rate and rhythm, normal S1 S2, no S3 or S4, no murmur, click or rub, no peripheral edema and peripheral pulses strong  MS: no gross musculoskeletal defects noted, no edema  NEURO: Normal strength and tone, mentation intact and speech normal  PSYCH: mentation appears normal, affect normal/bright

## 2021-05-07 ENCOUNTER — PATIENT OUTREACH (OUTPATIENT)
Dept: CARE COORDINATION | Facility: CLINIC | Age: 51
End: 2021-05-07

## 2021-05-07 ENCOUNTER — OFFICE VISIT (OUTPATIENT)
Dept: FAMILY MEDICINE | Facility: CLINIC | Age: 51
End: 2021-05-07
Payer: MEDICAID

## 2021-05-07 VITALS
TEMPERATURE: 97.6 F | BODY MASS INDEX: 26.36 KG/M2 | OXYGEN SATURATION: 97 % | HEIGHT: 66 IN | HEART RATE: 79 BPM | DIASTOLIC BLOOD PRESSURE: 90 MMHG | WEIGHT: 164 LBS | RESPIRATION RATE: 16 BRPM | SYSTOLIC BLOOD PRESSURE: 148 MMHG

## 2021-05-07 DIAGNOSIS — Z59.9 FINANCIAL DIFFICULTIES: ICD-10-CM

## 2021-05-07 DIAGNOSIS — F41.9 ANXIETY: Primary | ICD-10-CM

## 2021-05-07 DIAGNOSIS — F17.200 TOBACCO USE DISORDER: ICD-10-CM

## 2021-05-07 DIAGNOSIS — K04.7 DENTAL INFECTION: ICD-10-CM

## 2021-05-07 PROCEDURE — 99213 OFFICE O/P EST LOW 20 MIN: CPT | Performed by: FAMILY MEDICINE

## 2021-05-07 RX ORDER — ALPRAZOLAM 2 MG
TABLET ORAL
Qty: 30 TABLET | Refills: 0 | Status: SHIPPED | OUTPATIENT
Start: 2021-05-07 | End: 2021-06-05

## 2021-05-07 RX ORDER — CLINDAMYCIN HCL 150 MG
150 CAPSULE ORAL 3 TIMES DAILY
Qty: 30 CAPSULE | Refills: 0 | Status: SHIPPED | OUTPATIENT
Start: 2021-05-07 | End: 2021-05-17

## 2021-05-07 RX ORDER — SERTRALINE HYDROCHLORIDE 100 MG/1
50 TABLET, FILM COATED ORAL DAILY
Qty: 30 TABLET | Refills: 3 | Status: SHIPPED | OUTPATIENT
Start: 2021-05-07 | End: 2021-09-09

## 2021-05-07 SDOH — ECONOMIC STABILITY - INCOME SECURITY: PROBLEM RELATED TO HOUSING AND ECONOMIC CIRCUMSTANCES, UNSPECIFIED: Z59.9

## 2021-05-07 ASSESSMENT — ANXIETY QUESTIONNAIRES: GAD7 TOTAL SCORE: 5

## 2021-05-07 ASSESSMENT — PATIENT HEALTH QUESTIONNAIRE - PHQ9: SUM OF ALL RESPONSES TO PHQ QUESTIONS 1-9: 5

## 2021-05-07 ASSESSMENT — MIFFLIN-ST. JEOR: SCORE: 1545.14

## 2021-05-07 NOTE — PROGRESS NOTES
Clinic Care Coordination Contact  [facility]/Voicemail       Clinical Data: Care Coordinator Outreach  Outreach attempted x 1.  Left message on patient's voicemail with call back information and requested return call.  Plan: Care Coordinator will try to reach patient again in 1-2 business days.       Reason for Referral: Financial Support: Food, Housing, Insurance and Medication Affordability

## 2021-05-07 NOTE — LETTER
[facility] CARE COORDINATION        May 10, 2021    Matthew Lawler  [address]   [place] [address]      Dear Matthew,    I am a clinic community health worker who works with [facility]. I have been trying to reach you recently to introduce Clinic Care Coordination and to see if there was anything I could assist you with.  Below is a description of clinic care coordination and how I can further assist you.      The clinic care coordination team is made up of a registered nurse,  and community health worker who understand the health care system. The goal of clinic care coordination is to help you manage your health and improve access to the health care system in the most efficient manner. The team can assist you in meeting your health care goals by providing education, coordinating services, strengthening the communication among your providers and supporting you with any resource needs.    Please feel free to contact me at [phone] with any questions or concerns. We are focused on providing you with the highest-quality healthcare experience possible and that all starts with you.     Sincerely,       CORY Garces  Clinic Care Coordination  [facility]

## 2021-05-10 NOTE — PROGRESS NOTES
Clinic Care Coordination Contact  [facility]/Voicemail       Clinical Data: Care Coordinator Outreach  Outreach attempted x 2.  Left message on patient's voicemail with call back information and requested return call.  Plan: Care Coordinator will send unable to contact letter with care coordinator contact information via [employer]. Care Coordinator will do no further outreaches at this time.

## 2021-06-05 ENCOUNTER — MYC MEDICAL ADVICE (OUTPATIENT)
Dept: FAMILY MEDICINE | Facility: CLINIC | Age: 51
End: 2021-06-05

## 2021-06-05 ENCOUNTER — MYC REFILL (OUTPATIENT)
Dept: FAMILY MEDICINE | Facility: CLINIC | Age: 51
End: 2021-06-05

## 2021-06-05 DIAGNOSIS — F41.9 ANXIETY: ICD-10-CM

## 2021-06-07 RX ORDER — ALPRAZOLAM 2 MG
TABLET ORAL
Qty: 30 TABLET | Refills: 0 | Status: SHIPPED | OUTPATIENT
Start: 2021-06-07 | End: 2021-07-09

## 2021-06-07 NOTE — TELEPHONE ENCOUNTER
Pending Prescriptions:                       Disp   Refills    ALPRAZolam (XANAX) 2 MG tablet             30 tab*0        Sig: Take 1/4 tablet 4 times a day as needed for anxiety    Routing refill request to provider for review/approval because:  Drug not on the [facility] refill protocol

## 2021-06-23 ENCOUNTER — MYC MEDICAL ADVICE (OUTPATIENT)
Dept: FAMILY MEDICINE | Facility: CLINIC | Age: 51
End: 2021-06-23

## 2021-06-23 DIAGNOSIS — K04.7 DENTAL INFECTION: Primary | ICD-10-CM

## 2021-06-23 NOTE — TELEPHONE ENCOUNTER
, please review and advise prescribing an antibiotic without a visit.     Timothy Cunningham RN, BSN  [place] [facility]  June 23, 2021

## 2021-06-23 NOTE — TELEPHONE ENCOUNTER
Responded to mychart.     PLAN:   Once patient responds please sent to provider.   Timothy Cunningham RN  June 23, 2021

## 2021-06-24 ENCOUNTER — MYC MEDICAL ADVICE (OUTPATIENT)
Dept: FAMILY MEDICINE | Facility: CLINIC | Age: 51
End: 2021-06-24

## 2021-06-24 NOTE — TELEPHONE ENCOUNTER
See other encounter.    Arturo Franco, MONISHAN, RN, PHN  [facility] ~ Registered Nurse  Clinic Triage ~ [place] [facility] [place]  June 24, 2021

## 2021-06-24 NOTE — TELEPHONE ENCOUNTER
Patient calling back to check on antibiotic. Informed patient that Dr. Whitlock was not in clinic until tomorrow 6/25, patient is wondering if another provider will fill antibiotic, writer informs patient that most likely not without a visit. Patient does not have active insurance. Informed patient that message will be sent to SA and he will review.    Advised patient if pain gets worse then he may need to go into urgent care and be treated. Patient will await answer when SA returns.    MARITO Nieves/[facility]

## 2021-06-25 RX ORDER — CLINDAMYCIN HCL 150 MG
150 CAPSULE ORAL 3 TIMES DAILY
Qty: 30 CAPSULE | Refills: 0 | Status: SHIPPED | OUTPATIENT
Start: 2021-06-25 | End: 2021-07-05

## 2021-06-25 NOTE — TELEPHONE ENCOUNTER
50-year-old male with chronic dental infections, currently with financial hardship, unable to see that this at this time.  We have previously discussed the risk of colitis related to clindamycin, he is aware of this.  He has tried alternatives with no improvement.    Chip Whitlock MD, FAAFP  Family Medicine Physician  [facility] [place]  [address] [place] [address]

## 2021-07-12 ENCOUNTER — MYC REFILL (OUTPATIENT)
Dept: FAMILY MEDICINE | Facility: CLINIC | Age: 51
End: 2021-07-12

## 2021-07-12 DIAGNOSIS — F41.9 ANXIETY: ICD-10-CM

## 2021-07-12 NOTE — TELEPHONE ENCOUNTER
Pending Prescriptions:                       Disp   Refills    ALPRAZolam (XANAX) 2 MG tablet             5 tabl*0        Sig: Take 1/4 tablet 4 times a day as needed for anxiety    Routing refill request to provider for review/approval because:  Drug not on the [facility] refill protocol

## 2021-07-13 ENCOUNTER — MYC MEDICAL ADVICE (OUTPATIENT)
Dept: FAMILY MEDICINE | Facility: CLINIC | Age: 51
End: 2021-07-13

## 2021-07-13 RX ORDER — ALPRAZOLAM 2 MG
TABLET ORAL
Qty: 5 TABLET | Refills: 0 | Status: SHIPPED | OUTPATIENT
Start: 2021-07-13 | End: 2021-07-15

## 2021-07-17 ENCOUNTER — NURSE TRIAGE (OUTPATIENT)
Dept: NURSING | Facility: CLINIC | Age: 51
End: 2021-07-17

## 2021-07-17 NOTE — TELEPHONE ENCOUNTER
Clinic Action Needed:FYI  Reason for Call:  Patient calling.  Patient is at [facility] pharmacy attempting to fill Xanax prescription from 7/16/21. Reporting he is leaving out of town today. Stating pharmacy is requesting ok from clinic to release medication early.    Placed call to [facility] who advised patient filled a 5 day supply on 7/13/21. Stating they would not refill without provider approval early.    750 a.m. paged on call Dr George through [employer] to call Heike at .    Dr George approved ok to refill for today 7/17/21 (1 day early).  Patient notified.      Heike Painting RN  [facility]      Reason for Disposition    [1] Caller has URGENT medication question about med that PCP or specialist prescribed AND [2] triager unable to answer question    Additional Information    Negative: Drug overdose and triager unable to answer question    Negative: Caller requesting information unrelated to medicine    Negative: Caller requesting a prescription for Strep throat and has a positive culture result    Negative: Rash while taking a medication or within 3 days of stopping it    Negative: Immunization reaction suspected    Negative: [1] Asthma and [2] having symptoms of asthma (cough, wheezing, etc.)    Negative: [1] Influenza symptoms AND [2] anti-viral med prescription request, such as Tamiflu    Negative: [1] Symptom of illness (e.g., headache, abdominal pain, earache, vomiting) AND [2] more than mild    Negative: MORE THAN A DOUBLE DOSE of a prescription or over-the-counter (OTC) drug    Negative: [1] DOUBLE DOSE (an extra dose or lesser amount) of over-the-counter (OTC) drug AND [2] any symptoms (e.g., dizziness, nausea, pain, sleepiness)    Negative: [1] DOUBLE DOSE (an extra dose or lesser amount) of prescription drug AND [2] any symptoms (e.g., dizziness, nausea, pain, sleepiness)    Negative: Took another person's prescription drug    Negative: [1] DOUBLE DOSE (an  "extra dose or lesser amount) of prescription drug AND [2] NO symptoms (Exception: a double dose of antibiotics)    Negative: Diabetes drug error or overdose (e.g., took wrong type of insulin or took extra dose)    Negative: [1] Request for URGENT new prescription or refill of \"essential\" medication (i.e., likelihood of harm to patient if not taken) AND [2] triager unable to fill per unit policy    Negative: [1] Prescription not at pharmacy AND [2] was prescribed by PCP recently    Negative: [1] Pharmacy calling with prescription questions AND [2] triager unable to answer question    Protocols used: MEDICATION QUESTION CALL-A-AH      "

## 2021-07-22 ENCOUNTER — MYC MEDICAL ADVICE (OUTPATIENT)
Dept: FAMILY MEDICINE | Facility: CLINIC | Age: 51
End: 2021-07-22

## 2021-07-22 ENCOUNTER — TELEPHONE (OUTPATIENT)
Dept: FAMILY MEDICINE | Facility: CLINIC | Age: 51
End: 2021-07-22

## 2021-07-22 NOTE — TELEPHONE ENCOUNTER
Please reschedule for patient.  I am now able to see MA patients.    Chip Whitlock MD, FAAFP  Family Medicine Physician  [facility] [place]  [address] [place] [address]

## 2021-07-22 NOTE — TELEPHONE ENCOUNTER
Patient had appointment scheduled on 7/22/2021 with Dr. Whitlock. Patient has medicaid insurance Dr. Whitlock is not contracted with Medicaid. Patient declined to see another provider. Will reschedule with  when he has a [facility] insurance plan in place.

## 2021-07-23 NOTE — TELEPHONE ENCOUNTER
Postponing, has patient scheduled with Dr Whitlock?   MyChart message has been read  Thanks  Ruthy Anderson RT (R)

## 2021-07-30 NOTE — TELEPHONE ENCOUNTER
My Chart message read, also left message on patients voicemail.    - patient now able to be seen by Dr Whitlock, can schedule at any time    Sujatha RONO/

## 2021-08-13 ENCOUNTER — MYC REFILL (OUTPATIENT)
Dept: FAMILY MEDICINE | Facility: CLINIC | Age: 51
End: 2021-08-13

## 2021-08-13 DIAGNOSIS — F41.9 ANXIETY: ICD-10-CM

## 2021-08-13 RX ORDER — ALPRAZOLAM 2 MG
TABLET ORAL
Qty: 30 TABLET | Refills: 0 | Status: SHIPPED | OUTPATIENT
Start: 2021-08-13 | End: 2021-09-07

## 2021-08-13 NOTE — TELEPHONE ENCOUNTER
Pending Prescriptions:                       Disp   Refills    ALPRAZolam (XANAX) 2 MG tablet             30 tab*0        Sig: Take 1/4 tablet 4 times a day as needed for anxiety      Routing refill request to provider for review/approval because:  Drug not on the G refill protocol     Ruthy Rojo RN on 8/13/2021 at 8:41 AM

## 2021-09-07 ENCOUNTER — MYC MEDICAL ADVICE (OUTPATIENT)
Dept: FAMILY MEDICINE | Facility: CLINIC | Age: 51
End: 2021-09-07

## 2021-09-07 ENCOUNTER — MYC REFILL (OUTPATIENT)
Dept: FAMILY MEDICINE | Facility: CLINIC | Age: 51
End: 2021-09-07

## 2021-09-07 DIAGNOSIS — F41.9 ANXIETY: ICD-10-CM

## 2021-09-07 NOTE — TELEPHONE ENCOUNTER
Routing to provider-    Medication not on RN protocol    Anne-Marie Arroyo RN  [place]

## 2021-09-08 NOTE — PROGRESS NOTES
"Matthew is a 50 year old who is being evaluated via a billable video visit.      How would you like to obtain your AVS? MyChart  If the video visit is dropped, the invitation should be resent by: Text to cell phone: [phone]  Will anyone else be joining your video visit? No      Video Start Time: 1100    Assessment & Plan     Anxiety  50-year-old male with history of anxiety.  He has been taking alprazolam regularly, no deviation to prescription that I can identify.  We have discussed in the past starting daily medication and weaning off of alprazolam.  We conducted trial of sertraline, however patient stated that it made him feel irritable.  Given his history of chronic pain syndrome, lower side effect profile of irritability, will start duloxetine.  He will follow-up with me in 2 weeks, sooner if any worsening of mood.  Currently feels safe without suicidal or homicidal ideation.  He has scheduled follow-up with me in 2 weeks.  We will have scheduled labs at his upcoming follow-up appointment, to include UDS.  - DULoxetine (CYMBALTA) 30 MG capsule; Take 1 capsule (30 mg) by mouth daily for 7 days, THEN 1 capsule (30 mg) 2 times daily.  - ALPRAZolam (XANAX) 2 MG tablet; Take 1/4 tablet 4 times a day as needed for anxiety    Chronic pain syndrome  See above.           BMI:   Estimated body mass index is 26.29 kg/m  as calculated from the following:    Height as of 5/7/21: 1.682 m (5' 6.22\").    Weight as of 5/7/21: 74.4 kg (164 lb).       Return in about 2 weeks (around 9/23/2021).    Chip Whitlock MD  [facility] KEVIN Castro is a 50 year old who presents for the following health issues     History of Present Illness       Back Pain:  He presents for follow up of back pain. Patient's back pain is a chronic problem.  Location of back pain:  Right lower back, left lower back, right buttock, left buttock, right hip and left hip  Description of back pain: gnawing, sharp and " stabbing  Back pain spreads: right buttocks and left buttocks    Since patient first noticed back pain, pain is: unchanged  Does back pain interfere with his job:  Yes      Mental Health Follow-up:  Patient presents to follow-up on Anxiety.    Patient's anxiety since last visit has been:  Good  The patient is not having other symptoms associated with anxiety.  Any significant life events: job concerns, financial concerns, housing concerns and grief or loss  Patient is feeling anxious or having panic attacks.  Patient has no concerns about alcohol or drug use.     Social History  Tobacco Use    Smoking status: Current Every Day Smoker      Packs/day: 0.50      Years: 19.00      Pack years: 9.5      Types: Cigarettes    Smokeless tobacco: Never Used    Tobacco comment: 1/2 pack per day  Vaping Use    Vaping Use: Former      Substances: Nicotine      Devices: Disposable, Pre-filled or refillable cartridge, Refillable tank  Alcohol use: No  Drug use: No      Today's PHQ-9         PHQ-9 Total Score:         PHQ-9 Q9 Thoughts of better off dead/self-harm past 2 weeks :       Thoughts of suicide or self harm:      Self-harm Plan:        Self-harm Action:          Safety concerns for self or others:           He eats 0-1 servings of fruits and vegetables daily.He consumes 3 sweetened beverage(s) daily.He exercises with enough effort to increase his heart rate 9 or less minutes per day.  He exercises with enough effort to increase his heart rate 3 or less days per week.   He is taking medications regularly.         Review of Systems   Constitutional, HEENT, cardiovascular, pulmonary, gi and gu systems are negative, except as otherwise noted.      Objective           Vitals:  No vitals were obtained today due to virtual visit.    Physical Exam   GENERAL: Healthy, alert and no distress  EYES: Eyes grossly normal to inspection.  No discharge or erythema, or obvious scleral/conjunctival abnormalities.  RESP: No audible wheeze,  cough, or visible cyanosis.  No visible retractions or increased work of breathing.    SKIN: Visible skin clear. No significant rash, abnormal pigmentation or lesions.  NEURO: Cranial nerves grossly intact.  Mentation and speech appropriate for age.  PSYCH: Mentation appears normal, affect normal/bright, judgement and insight intact, normal speech and appearance well-groomed.              Video-Visit Details    Type of service:  Video Visit    Video End Time:11:16 AM    Originating Location (pt. Location): Home    Distant Location (provider location):  [facility]     Platform used for Video Visit: Web[employer]  Answers for HPI/ROS submitted by the patient on 9/9/2021  ISHMAEL 7 TOTAL SCORE: 4

## 2021-09-08 NOTE — TELEPHONE ENCOUNTER
Pending Prescriptions:                       Disp   Refills    ALPRAZolam (XANAX) 2 MG tablet             30 tab*0        Sig: Take 1/4 tablet 4 times a day as needed for anxiety    Routing refill request to provider for review/approval because:  Drug not on the G refill protocol   ALPRAZolam (XANAX) 2 MG tablet 30 tablet 0 8/13/2021  No   Sig: Take 1/4 tablet 4 times a day as needed for anxiety   Sent to pharmacy as: ALPRAZolam 2 MG Oral Tablet (XANAX)   Class: E-Prescribe   Order: [id number]   E-Prescribing Status: Receipt confirmed by pharmacy (8/13/2021 10:09 AM CDT)

## 2021-09-09 ENCOUNTER — VIRTUAL VISIT (OUTPATIENT)
Dept: FAMILY MEDICINE | Facility: CLINIC | Age: 51
End: 2021-09-09
Payer: COMMERCIAL

## 2021-09-09 DIAGNOSIS — F41.9 ANXIETY: ICD-10-CM

## 2021-09-09 DIAGNOSIS — G89.4 CHRONIC PAIN SYNDROME: Primary | ICD-10-CM

## 2021-09-09 PROCEDURE — 99214 OFFICE O/P EST MOD 30 MIN: CPT | Mod: 95 | Performed by: FAMILY MEDICINE

## 2021-09-09 RX ORDER — ALPRAZOLAM 2 MG
TABLET ORAL
Qty: 30 TABLET | Refills: 0 | Status: SHIPPED | OUTPATIENT
Start: 2021-09-09 | End: 2021-09-09

## 2021-09-09 RX ORDER — DULOXETIN HYDROCHLORIDE 30 MG/1
CAPSULE, DELAYED RELEASE ORAL
Qty: 67 CAPSULE | Refills: 0 | Status: SHIPPED | OUTPATIENT
Start: 2021-09-09 | End: 2021-10-26

## 2021-09-09 RX ORDER — ALPRAZOLAM 2 MG
TABLET ORAL
Qty: 30 TABLET | Refills: 0 | Status: SHIPPED | OUTPATIENT
Start: 2021-09-09 | End: 2021-10-04

## 2021-09-09 ASSESSMENT — ANXIETY QUESTIONNAIRES
5. BEING SO RESTLESS THAT IT IS HARD TO SIT STILL: NOT AT ALL
GAD7 TOTAL SCORE: 4
GAD7 TOTAL SCORE: 4
6. BECOMING EASILY ANNOYED OR IRRITABLE: SEVERAL DAYS
1. FEELING NERVOUS, ANXIOUS, OR ON EDGE: SEVERAL DAYS
7. FEELING AFRAID AS IF SOMETHING AWFUL MIGHT HAPPEN: NOT AT ALL
7. FEELING AFRAID AS IF SOMETHING AWFUL MIGHT HAPPEN: NOT AT ALL
4. TROUBLE RELAXING: SEVERAL DAYS
GAD7 TOTAL SCORE: 4
8. IF YOU CHECKED OFF ANY PROBLEMS, HOW DIFFICULT HAVE THESE MADE IT FOR YOU TO DO YOUR WORK, TAKE CARE OF THINGS AT HOME, OR GET ALONG WITH OTHER PEOPLE?: NOT DIFFICULT AT ALL
2. NOT BEING ABLE TO STOP OR CONTROL WORRYING: NOT AT ALL
3. WORRYING TOO MUCH ABOUT DIFFERENT THINGS: SEVERAL DAYS

## 2021-09-10 ASSESSMENT — ANXIETY QUESTIONNAIRES: GAD7 TOTAL SCORE: 4

## 2021-09-15 ENCOUNTER — TELEPHONE (OUTPATIENT)
Dept: LAB | Facility: CLINIC | Age: 51
End: 2021-09-15

## 2021-09-15 NOTE — PROGRESS NOTES
Patient is coming in for labwork tomorrow. The orders in his chart are pending. Please sign orders for  any needed labwork.    Thank you,  [facility] [place]

## 2021-09-20 ENCOUNTER — MYC MEDICAL ADVICE (OUTPATIENT)
Dept: FAMILY MEDICINE | Facility: CLINIC | Age: 51
End: 2021-09-20

## 2021-10-03 ENCOUNTER — HEALTH MAINTENANCE LETTER (OUTPATIENT)
Age: 51
End: 2021-10-03

## 2021-10-04 ENCOUNTER — MYC REFILL (OUTPATIENT)
Dept: FAMILY MEDICINE | Facility: CLINIC | Age: 51
End: 2021-10-04

## 2021-10-04 DIAGNOSIS — F41.9 ANXIETY: ICD-10-CM

## 2021-10-05 RX ORDER — ALPRAZOLAM 2 MG
TABLET ORAL
Qty: 30 TABLET | Refills: 0 | Status: SHIPPED | OUTPATIENT
Start: 2021-10-05 | End: 2021-10-26

## 2021-10-11 ENCOUNTER — MYC MEDICAL ADVICE (OUTPATIENT)
Dept: FAMILY MEDICINE | Facility: CLINIC | Age: 51
End: 2021-10-11

## 2021-10-13 NOTE — PROGRESS NOTES
SUBJECTIVE:   CC: Matthew Lawler is an 50 year old male who presents for preventative health visit.     Patient has been advised of split billing requirements and indicates understanding: Yes       Healthy Habits:     Getting at least 3 servings of Calcium per day:  NO    Bi-annual eye exam:  NO    Dental care twice a year:  NO    Diet:  Regular (no restrictions)    Frequency of exercise:  None    Medication side effects:  None    PHQ-2 Total Score: 1    Additional concerns today:  Yes          Today's PHQ-2 Score:   PHQ-2 ( 1999 Pfizer) 9/15/2021   Q1: Little interest or pleasure in doing things 1   Q2: Feeling down, depressed or hopeless 0   PHQ-2 Score 1   Q1: Little interest or pleasure in doing things Several days   Q2: Feeling down, depressed or hopeless Not at all   PHQ-2 Score 1       Abuse: Current or Past(Physical, Sexual or Emotional)- No  Do you feel safe in your environment? Yes    Have you ever done Advance Care Planning? (For example, a Health Directive, POLST, or a discussion with a medical provider or your loved ones about your wishes): No, advance care planning information given to patient to review.  Patient plans to discuss their wishes with loved ones or provider.      Social History     Tobacco Use     Smoking status: Current Every Day Smoker     Packs/day: 0.50     Years: 19.00     Pack years: 9.50     Types: Cigarettes     Smokeless tobacco: Never Used     Tobacco comment: 1/2 pack per day   Substance Use Topics     Alcohol use: No         Alcohol Use 9/15/2021   Prescreen: >3 drinks/day or >7 drinks/week? Not Applicable       Last PSA: No results found for: PSA    Reviewed orders with patient. Reviewed health maintenance and updated orders accordingly - Yes  Lab work is in process  Labs reviewed in EPIC  BP Readings from Last 3 Encounters:   10/14/21 134/76   05/07/21 (!) 148/90   02/28/18 104/62    Wt Readings from Last 3 Encounters:   10/14/21 78 kg (172 lb)   05/07/21 74.4 kg (164 lb)  "  02/06/18 72 kg (158 lb 12.8 oz)                    Reviewed and updated as needed this visit by clinical staff                 Reviewed and updated as needed this visit by Provider                Past Medical History:   Diagnosis Date     Anxiety      Chronic pain      COPD (chronic obstructive pulmonary disease) (H)      Degeneration of lumbar or lumbosacral intervertebral disc     L4/L5 and L5/S1 --work injury      Past Surgical History:   Procedure Laterality Date     INJECT EPIDURAL LUMBAR  06/16/2014    [facility]     SURGICAL HISTORY OF -       Bilateral carpal tunnel release     SURGICAL HISTORY OF -       Tendon repair right elbow       Review of Systems   Constitutional: Negative for chills and fever.   HENT: Positive for congestion. Negative for ear pain, hearing loss and sore throat.    Eyes: Negative for pain.   Respiratory: Positive for cough.    Cardiovascular: Negative for chest pain, palpitations and peripheral edema.   Gastrointestinal: Negative for abdominal pain, diarrhea, heartburn, hematochezia and nausea.   Genitourinary: Positive for dysuria. Negative for discharge, frequency, genital sores, hematuria, impotence and urgency.   Musculoskeletal: Negative for arthralgias, joint swelling and myalgias.   Skin: Negative for rash.   Neurological: Negative for dizziness, headaches and paresthesias.   Psychiatric/Behavioral: Negative for mood changes. The patient is nervous/anxious.          OBJECTIVE:   /76 (BP Location: Left arm, Patient Position: Chair, Cuff Size: Adult Regular)   Pulse 90   Temp 97.9  F (36.6  C) (Temporal)   Resp 18   Ht 1.683 m (5' 6.25\")   Wt 78 kg (172 lb)   SpO2 96%   BMI 27.55 kg/m      Physical Exam  GENERAL: healthy, alert and no distress  EYES: Eyes grossly normal to inspection, PERRL and conjunctivae and sclerae normal  HENT: ear canals and TM's normal, nose and mouth without ulcers or lesions  NECK: no adenopathy, no asymmetry, masses, or scars and " thyroid normal to palpation  RESP: lungs clear to auscultation - no rales, rhonchi or wheezes  CV: regular rate and rhythm, normal S1 S2, no S3 or S4, no murmur, click or rub, no peripheral edema and peripheral pulses strong  ABDOMEN: soft, nontender, no hepatosplenomegaly, no masses and bowel sounds normal   (male): normal male genitalia without lesions or urethral discharge, no hernia  RECTAL: normal sphincter tone, no rectal masses and prostate symmetrically enlarged, nontender  MS: no gross musculoskeletal defects noted, no edema  SKIN: no suspicious lesions or rashes  NEURO: Normal strength and tone, mentation intact and speech normal  PSYCH: mentation appears normal, affect normal/bright    Diagnostic Test Results:  Labs reviewed in Epic    ASSESSMENT/PLAN:   (Z00.00) Routine general medical examination at a health care facility  (primary encounter diagnosis)  Comment: 50-year-old male here for annual well exam.  We discussed current cancer screening guidelines.  See below for other issues addressed.  Plan: GLUCOSE            (N40.1,  R39.16) Benign prostatic hyperplasia (BPH) with straining on urination  Comment: BPH by symptoms and exam.  Obtaining PSA today.  Also ordered Flomax.  Plan: PSA, screen, tamsulosin (FLOMAX) 0.4 MG         capsule, UA with Microscopic reflex to Culture         - lab collect            (K04.7) Dental infection  Comment: Chronic dental infections, he is waiting to see a dentist.  We discussed the dangers of taking clindamycin too much, C. difficile infections.  He understands.  Plan: clindamycin (CLEOCIN) 300 MG capsule            (G89.4) Chronic pain syndrome  Comment: He has been taking alprazolam for his anxiety, he has a history of drug use, denies current use.  Plan: Drug Abuse Screen Panel 13, Urine (Pain Care         Package) - lab collect            (Z23) Need for prophylactic vaccination and inoculation against influenza  Comment: Flu vaccine conducted today.  Plan:  "Repeat annually.    (Z12.11) Screen for colon cancer  Comment: Due for colon cancer screening.  Plan: Adult Gastro Ref - Procedure Only            (Z13.220) Screening for hyperlipidemia  Comment: Obtaining lipid panel today.  Plan: Await results    (Z11.59) Need for hepatitis C screening test  Comment: History of IV drug use in the past.  Plan: Hepatitis C screening today.    (Z11.4) Screening for HIV (human immunodeficiency virus)  Comment: Same as above.  Plan: HIV screening today.    Patient has been advised of split billing requirements and indicates understanding: Yes  COUNSELING:   Reviewed preventive health counseling, as reflected in patient instructions       Regular exercise       Healthy diet/nutrition       Immunizations    Vaccinated for: Influenza             Colon cancer screening       Prostate cancer screening    Estimated body mass index is 26.29 kg/m  as calculated from the following:    Height as of 5/7/21: 1.682 m (5' 6.22\").    Weight as of 5/7/21: 74.4 kg (164 lb).     Weight management plan: Discussed healthy diet and exercise guidelines    He reports that he has been smoking cigarettes. He has a 9.50 pack-year smoking history. He has never used smokeless tobacco.  Tobacco Cessation Action Plan:   Information offered: Patient not interested at this time      Counseling Resources:  ATP IV Guidelines  Pooled Cohorts Equation Calculator  FRAX Risk Assessment  ICSI Preventive Guidelines  Dietary Guidelines for Americans, 2010  USDA's MyPlate  ASA Prophylaxis  Lung CA Screening    Chip Whitlock MD  [facility] KEVIN  Answers for HPI/ROS submitted by the patient on 10/14/2021  If you checked off any problems, how difficult have these problems made it for you to do your work, take care of things at home, or get along with other people?: Somewhat difficult  PHQ9 TOTAL SCORE: 3  ISHMAEL 7 TOTAL SCORE: 6    Conflicting answers have been found for some questions. Please document the " patient's answers manually.

## 2021-10-14 ENCOUNTER — OFFICE VISIT (OUTPATIENT)
Dept: FAMILY MEDICINE | Facility: CLINIC | Age: 51
End: 2021-10-14
Payer: COMMERCIAL

## 2021-10-14 VITALS
HEART RATE: 90 BPM | HEIGHT: 66 IN | SYSTOLIC BLOOD PRESSURE: 134 MMHG | RESPIRATION RATE: 18 BRPM | TEMPERATURE: 97.9 F | OXYGEN SATURATION: 96 % | DIASTOLIC BLOOD PRESSURE: 76 MMHG | BODY MASS INDEX: 27.64 KG/M2 | WEIGHT: 172 LBS

## 2021-10-14 DIAGNOSIS — G89.4 CHRONIC PAIN SYNDROME: ICD-10-CM

## 2021-10-14 DIAGNOSIS — K04.7 DENTAL INFECTION: ICD-10-CM

## 2021-10-14 DIAGNOSIS — Z12.11 SCREEN FOR COLON CANCER: ICD-10-CM

## 2021-10-14 DIAGNOSIS — Z00.00 ROUTINE GENERAL MEDICAL EXAMINATION AT A HEALTH CARE FACILITY: Primary | ICD-10-CM

## 2021-10-14 DIAGNOSIS — R39.16 BENIGN PROSTATIC HYPERPLASIA (BPH) WITH STRAINING ON URINATION: ICD-10-CM

## 2021-10-14 DIAGNOSIS — Z11.59 NEED FOR HEPATITIS C SCREENING TEST: ICD-10-CM

## 2021-10-14 DIAGNOSIS — N40.1 BENIGN PROSTATIC HYPERPLASIA (BPH) WITH STRAINING ON URINATION: ICD-10-CM

## 2021-10-14 DIAGNOSIS — Z13.220 SCREENING FOR HYPERLIPIDEMIA: ICD-10-CM

## 2021-10-14 DIAGNOSIS — Z23 NEED FOR PROPHYLACTIC VACCINATION AND INOCULATION AGAINST INFLUENZA: ICD-10-CM

## 2021-10-14 DIAGNOSIS — Z11.4 SCREENING FOR HIV (HUMAN IMMUNODEFICIENCY VIRUS): ICD-10-CM

## 2021-10-14 LAB
CHOLEST SERPL-MCNC: 134 MG/DL
FASTING STATUS PATIENT QL REPORTED: YES
FASTING STATUS PATIENT QL REPORTED: YES
GLUCOSE BLD-MCNC: 103 MG/DL (ref 70–99)
HDLC SERPL-MCNC: 32 MG/DL
LDLC SERPL CALC-MCNC: 42 MG/DL
NONHDLC SERPL-MCNC: 102 MG/DL
PSA SERPL-MCNC: 1.55 UG/L (ref 0–4)
TRIGL SERPL-MCNC: 301 MG/DL

## 2021-10-14 PROCEDURE — 87902 NFCT AGT GNTYP ALYS HEP C: CPT | Performed by: FAMILY MEDICINE

## 2021-10-14 PROCEDURE — 82947 ASSAY GLUCOSE BLOOD QUANT: CPT | Performed by: FAMILY MEDICINE

## 2021-10-14 PROCEDURE — 99214 OFFICE O/P EST MOD 30 MIN: CPT | Mod: 25 | Performed by: FAMILY MEDICINE

## 2021-10-14 PROCEDURE — 90471 IMMUNIZATION ADMIN: CPT | Performed by: FAMILY MEDICINE

## 2021-10-14 PROCEDURE — 86803 HEPATITIS C AB TEST: CPT | Performed by: FAMILY MEDICINE

## 2021-10-14 PROCEDURE — 99396 PREV VISIT EST AGE 40-64: CPT | Mod: 25 | Performed by: FAMILY MEDICINE

## 2021-10-14 PROCEDURE — 80061 LIPID PANEL: CPT | Performed by: FAMILY MEDICINE

## 2021-10-14 PROCEDURE — 99000 SPECIMEN HANDLING OFFICE-LAB: CPT | Performed by: FAMILY MEDICINE

## 2021-10-14 PROCEDURE — G0103 PSA SCREENING: HCPCS | Performed by: FAMILY MEDICINE

## 2021-10-14 PROCEDURE — 87389 HIV-1 AG W/HIV-1&-2 AB AG IA: CPT | Performed by: FAMILY MEDICINE

## 2021-10-14 PROCEDURE — 36415 COLL VENOUS BLD VENIPUNCTURE: CPT | Performed by: FAMILY MEDICINE

## 2021-10-14 PROCEDURE — 90682 RIV4 VACC RECOMBINANT DNA IM: CPT | Performed by: FAMILY MEDICINE

## 2021-10-14 RX ORDER — TAMSULOSIN HYDROCHLORIDE 0.4 MG/1
0.4 CAPSULE ORAL DAILY
Qty: 30 CAPSULE | Refills: 3 | Status: SHIPPED | OUTPATIENT
Start: 2021-10-14 | End: 2022-04-18

## 2021-10-14 RX ORDER — CLINDAMYCIN HCL 300 MG
300 CAPSULE ORAL 3 TIMES DAILY
Qty: 30 CAPSULE | Refills: 0 | Status: SHIPPED | OUTPATIENT
Start: 2021-10-14 | End: 2021-10-24

## 2021-10-14 ASSESSMENT — ANXIETY QUESTIONNAIRES
2. NOT BEING ABLE TO STOP OR CONTROL WORRYING: NOT AT ALL
4. TROUBLE RELAXING: NEARLY EVERY DAY
3. WORRYING TOO MUCH ABOUT DIFFERENT THINGS: NOT AT ALL
1. FEELING NERVOUS, ANXIOUS, OR ON EDGE: SEVERAL DAYS
GAD7 TOTAL SCORE: 6
6. BECOMING EASILY ANNOYED OR IRRITABLE: SEVERAL DAYS
7. FEELING AFRAID AS IF SOMETHING AWFUL MIGHT HAPPEN: NOT AT ALL
8. IF YOU CHECKED OFF ANY PROBLEMS, HOW DIFFICULT HAVE THESE MADE IT FOR YOU TO DO YOUR WORK, TAKE CARE OF THINGS AT HOME, OR GET ALONG WITH OTHER PEOPLE?: SOMEWHAT DIFFICULT
GAD7 TOTAL SCORE: 6
7. FEELING AFRAID AS IF SOMETHING AWFUL MIGHT HAPPEN: NOT AT ALL
GAD7 TOTAL SCORE: 6
5. BEING SO RESTLESS THAT IT IS HARD TO SIT STILL: SEVERAL DAYS

## 2021-10-14 ASSESSMENT — ENCOUNTER SYMPTOMS
SHORTNESS OF BREATH: 0
CHILLS: 0
NERVOUS/ANXIOUS: 1
ARTHRALGIAS: 0
EYE PAIN: 0
MYALGIAS: 0
HEMATURIA: 0
DIARRHEA: 0
COUGH: 1
NAUSEA: 0
ABDOMINAL PAIN: 0
WEAKNESS: 1
FREQUENCY: 0
DYSURIA: 1
JOINT SWELLING: 0
FEVER: 0
CONSTIPATION: 0
DIZZINESS: 0
PARESTHESIAS: 0
HEARTBURN: 0
HEADACHES: 0
PALPITATIONS: 0
HEMATOCHEZIA: 0
SORE THROAT: 0

## 2021-10-14 ASSESSMENT — PAIN SCALES - GENERAL: PAINLEVEL: EXTREME PAIN (8)

## 2021-10-14 ASSESSMENT — PATIENT HEALTH QUESTIONNAIRE - PHQ9
SUM OF ALL RESPONSES TO PHQ QUESTIONS 1-9: 3
SUM OF ALL RESPONSES TO PHQ QUESTIONS 1-9: 3
10. IF YOU CHECKED OFF ANY PROBLEMS, HOW DIFFICULT HAVE THESE PROBLEMS MADE IT FOR YOU TO DO YOUR WORK, TAKE CARE OF THINGS AT HOME, OR GET ALONG WITH OTHER PEOPLE: SOMEWHAT DIFFICULT

## 2021-10-14 ASSESSMENT — MIFFLIN-ST. JEOR: SCORE: 1581.91

## 2021-10-15 DIAGNOSIS — R76.8 HEPATITIS C ANTIBODY TEST POSITIVE: Primary | ICD-10-CM

## 2021-10-15 LAB
HCV AB SERPL QL IA: REACTIVE
HIV 1+2 AB+HIV1 P24 AG SERPL QL IA: NONREACTIVE

## 2021-10-15 ASSESSMENT — PATIENT HEALTH QUESTIONNAIRE - PHQ9: SUM OF ALL RESPONSES TO PHQ QUESTIONS 1-9: 3

## 2021-10-15 ASSESSMENT — ANXIETY QUESTIONNAIRES: GAD7 TOTAL SCORE: 6

## 2021-10-15 NOTE — RESULT ENCOUNTER NOTE
Matthew,  Your recent studies showed a increased triglycerides. This is most often related to diet, recommend improving your diet, consider adding a fiber supplement. Try to avoid processed foods as much as possible and we should repeat this in 1 year.  Please let me know if you have any questions or concerns and follow up as discussed in clinic.    Sincerely.  Dr. JEANIE Whitlock MD, FAAFP  Family Medicine Physician  [facility] [place]  [address] [place] [address]

## 2021-10-15 NOTE — RESULT ENCOUNTER NOTE
Matthew,  Your recent studies was positive for hepatitis C antibodies.  We need to run another test to determine if you have an active infection.  I will have the team reach out to you to schedule that.  Please let me know if you have any questions or concerns and follow up as discussed in clinic.    Sincerely.  Dr. JEANIE Whitlock MD, FAAFP  Family Medicine Physician  [facility] [place]  [id number] [place] [address], [place] [address]

## 2021-10-15 NOTE — PROGRESS NOTES
Please schedule patient for lab only visit.    Chip Whitlock MD, FAAFP  Family Medicine Physician  [facility] [place]  [address] [place] [address]

## 2021-10-15 NOTE — PROGRESS NOTES
Spoke with pt, hesitiant to schedule as he doesn't have a ride. Will call back as soon as he can figure the ride out

## 2021-10-17 LAB
HCV RNA SERPL NAA+PROBE-ACNC: ABNORMAL IU/ML
HCV RNA SERPL NAA+PROBE-LOG IU: 7.2 {LOG_IU}/ML

## 2021-10-19 DIAGNOSIS — B19.20 HEPATITIS C VIRUS INFECTION, UNSPECIFIED CHRONICITY: Primary | ICD-10-CM

## 2021-10-19 NOTE — RESULT ENCOUNTER NOTE
Mattehw,  Your recent studies showed that you have an active hepatitis C infection.  I have placed a referral to gastroenterology, they are the specialist that would help treat this.  Please see them as this is very treatable at this point.  Please let me know if you have any questions or concerns and follow up as discussed in clinic.    Sincerely.  Dr. JEANIE Whitlock MD, [facility]  Family Medicine Physician  [facility] [place]  [address] [place] [address]

## 2021-10-24 LAB — HCV GENTYP SERPL NAA+PROBE: NORMAL

## 2021-10-25 ENCOUNTER — MYC MEDICAL ADVICE (OUTPATIENT)
Dept: FAMILY MEDICINE | Facility: CLINIC | Age: 51
End: 2021-10-25

## 2021-10-25 DIAGNOSIS — F41.9 ANXIETY: ICD-10-CM

## 2021-10-26 ENCOUNTER — MYC MEDICAL ADVICE (OUTPATIENT)
Dept: FAMILY MEDICINE | Facility: CLINIC | Age: 51
End: 2021-10-26

## 2021-10-26 ENCOUNTER — TELEPHONE (OUTPATIENT)
Dept: FAMILY MEDICINE | Facility: CLINIC | Age: 51
End: 2021-10-26

## 2021-10-26 DIAGNOSIS — F41.9 ANXIETY: ICD-10-CM

## 2021-10-26 RX ORDER — DULOXETIN HYDROCHLORIDE 30 MG/1
CAPSULE, DELAYED RELEASE ORAL
Qty: 67 CAPSULE | Refills: 2 | Status: SHIPPED | OUTPATIENT
Start: 2021-10-26 | End: 2023-07-26

## 2021-10-26 RX ORDER — ALPRAZOLAM 2 MG
TABLET ORAL
Qty: 30 TABLET | Refills: 0 | Status: SHIPPED | OUTPATIENT
Start: 2021-10-26 | End: 2023-07-26

## 2021-10-26 NOTE — TELEPHONE ENCOUNTER
Pt is at the pharmacy waiting for prescription. Pharmacy is requiring that a form be signed for it to be approved since the patient is paying cash. The pharmacy has faxed that form to the [facility] at around 1100 this morning.  They have not received the form back and therefore won't fill the Rx yet for the patient. [facility] RN contacted. Form was signed and sent.

## 2021-10-26 NOTE — TELEPHONE ENCOUNTER
EVIE signed in SA absence this afternoon. I faxed back to pharmacy and gave to Ruthy to hold for the afternoon in case there are any issues after I leave today. Ruthy will send to scanning at the end of the day

## 2021-10-26 NOTE — TELEPHONE ENCOUNTER
The pharmacy needs a verbal authorization to fill it from the doctor.    Verbal authorization given per provider notes in previous encounter that a refill was sent.      Ruthy Rojo RN on 10/26/2021 at 12:30 PM

## 2021-10-26 NOTE — TELEPHONE ENCOUNTER
Prescription approved per [facility] Refill Protocol.  MONISHA RaeN, RN, PHN  [place] [facility]  October 26, 2021

## 2021-10-26 NOTE — TELEPHONE ENCOUNTER
Reason for Call:  Form, our goal is to have forms completed with 72 hours, however, some forms may require a visit or additional information.    Type of letter, form or note:  Notice of non covered prescription    Who is the form from?: [place] [employer] Services (if other please explain)    Where did the form come from: form was faxed in    What clinic location was the form placed at?: [facility] [phone]    Where the form was placed: [facility] Box/Folder    What number is listed as a contact on the form?:         Additional comments:     Call taken on 10/26/2021 at 1:01 PM by Gabi Tinsley

## 2021-10-27 RX ORDER — DULOXETIN HYDROCHLORIDE 30 MG/1
CAPSULE, DELAYED RELEASE ORAL
Qty: 67 CAPSULE | Refills: 2 | OUTPATIENT
Start: 2021-10-27

## 2021-10-29 ENCOUNTER — MYC MEDICAL ADVICE (OUTPATIENT)
Dept: FAMILY MEDICINE | Facility: CLINIC | Age: 51
End: 2021-10-29

## 2021-10-29 DIAGNOSIS — J30.1 ALLERGIC RHINITIS DUE TO POLLEN, UNSPECIFIED SEASONALITY: Primary | ICD-10-CM

## 2021-10-29 RX ORDER — FLUTICASONE PROPIONATE 50 MCG
1 SPRAY, SUSPENSION (ML) NASAL DAILY
Qty: 16 G | Refills: 3 | Status: SHIPPED | OUTPATIENT
Start: 2021-10-29 | End: 2021-12-16

## 2021-11-22 ENCOUNTER — OFFICE VISIT (OUTPATIENT)
Dept: FAMILY MEDICINE | Facility: CLINIC | Age: 51
End: 2021-11-22
Payer: COMMERCIAL

## 2021-11-22 VITALS
RESPIRATION RATE: 18 BRPM | OXYGEN SATURATION: 100 % | WEIGHT: 174.2 LBS | BODY MASS INDEX: 27.9 KG/M2 | TEMPERATURE: 98.1 F | HEART RATE: 96 BPM | SYSTOLIC BLOOD PRESSURE: 122 MMHG | DIASTOLIC BLOOD PRESSURE: 74 MMHG

## 2021-11-22 DIAGNOSIS — F11.93 HEROIN WITHDRAWAL (H): Primary | ICD-10-CM

## 2021-11-22 DIAGNOSIS — B19.20 HEPATITIS C VIRUS INFECTION WITHOUT HEPATIC COMA, UNSPECIFIED CHRONICITY: ICD-10-CM

## 2021-11-22 LAB
ALBUMIN UR-MCNC: NEGATIVE MG/DL
AMPHETAMINES UR QL: NOT DETECTED
APPEARANCE UR: CLEAR
BARBITURATES UR QL SCN: NOT DETECTED
BENZODIAZ UR QL SCN: DETECTED
BILIRUB UR QL STRIP: NEGATIVE
BUPRENORPHINE UR QL: NOT DETECTED
CANNABINOIDS UR QL: NOT DETECTED
COCAINE UR QL SCN: NOT DETECTED
COLOR UR AUTO: YELLOW
D-METHAMPHET UR QL: NOT DETECTED
GLUCOSE UR STRIP-MCNC: NEGATIVE MG/DL
HGB UR QL STRIP: NEGATIVE
KETONES UR STRIP-MCNC: 15 MG/DL
LEUKOCYTE ESTERASE UR QL STRIP: NEGATIVE
METHADONE UR QL SCN: NOT DETECTED
NITRATE UR QL: NEGATIVE
OPIATES UR QL SCN: DETECTED
OXYCODONE UR QL SCN: NOT DETECTED
PCP UR QL SCN: NOT DETECTED
PH UR STRIP: 7 [PH] (ref 5–7)
PROPOXYPH UR QL: NOT DETECTED
RBC #/AREA URNS AUTO: NORMAL /HPF
SP GR UR STRIP: 1.02 (ref 1–1.03)
TRICYCLICS UR QL SCN: NOT DETECTED
UROBILINOGEN UR STRIP-ACNC: 0.2 E.U./DL
WBC #/AREA URNS AUTO: NORMAL /HPF

## 2021-11-22 PROCEDURE — 99213 OFFICE O/P EST LOW 20 MIN: CPT | Performed by: NURSE PRACTITIONER

## 2021-11-22 PROCEDURE — 81001 URINALYSIS AUTO W/SCOPE: CPT | Mod: 59 | Performed by: NURSE PRACTITIONER

## 2021-11-22 PROCEDURE — 80306 DRUG TEST PRSMV INSTRMNT: CPT | Performed by: NURSE PRACTITIONER

## 2021-11-22 NOTE — PROGRESS NOTES
"  Assessment & Plan     Heroin withdrawal (H)  Discussed need and referral to ED, higher level of care and care coordination. Pt. requesting suboxone, discussed unable to prescribe as not licensed and process for doing this.   Discussed SA of Opioid withdrawal and potential death. Patient stated \" I know\".  Pt. States he will likley use again, and advised against this. Know active Hep C and pt. Aware, and knows he is needing sobriety to treat this as well.   Pt. Declines further aid. Declined multiple resource options.     Pt. Is rational, not able to hold.     Hepatitis C virus infection without hepatic coma, unspecified chronicity      Return to clinic with any new or worsening symptoms, and as needed.         Return in about 1 day (around 11/23/2021), or to ED advised..    [facility] KEVIN Castro is a 50 year old who presents for the following health issues  accompanied by his self.    HPI     Patient has been using for about 3 years. States that he started because he was on pain medications and stopped those- thought he was going to be able to get of the heroin on his own.     Last time used was 2 days ago. Patient has Dx with Hep C and knows he needs to quit. States that his dealer went to [facility] and has no other way to get the heroin- so he's using this time to his benefit to quit.     Started hitting the withdrawal about 8 hours from the time he last used. States that he tried to get into a Methadone clinic that could help him withdrawal. States that he went to one in person near where he lives- and they were months out and did not have a bed for him. They gave him a list of other clinics and he called all those and they are months out as well. None of the clinics had a waiting list that he could get onto and would have to call back.     He was at the [facility] clinic and they recommended him see his PCP clinic to see if they could help him with symptoms at all. "     States that he is unable to keep anything down- anytime he drinks anything he throws it back up. Last time he was able to keep anything down was 2 days ago. He tired to eat yesterday and he did not keep that down long at all.     Other symptoms:   -chills  -restless legs  -abdominal pain  -joint pain  -diarrhea this morning    Complications include known active Hep C infection.     Pt. Is living in a hotel with his wife and 17 y.o. daughter. He is working to pay the hotel so they don't get evicted.   He does not want aid with social work or shelter placement due to having pets that he is not willing to give up or lend to foster care.               Review of Systems   Constitutional, HEENT, cardiovascular, pulmonary, gi and gu systems are negative, except as otherwise noted.      Objective    /74   Pulse 96   Temp 98.1  F (36.7  C) (Temporal)   Resp 18   Wt 79 kg (174 lb 3.2 oz)   SpO2 100%   BMI 27.90 kg/m    Body mass index is 27.9 kg/m .  Physical Exam   GENERAL:  alert and not distressed- is seated and curling over abdomen  NEURO: Normal strength and tone, mentation intact and speech normal, no tremors noted  PSYCH: mentation appears normal, affect calm, normal eye contact, insight is limited by addiction. Rational thought processing, strong chemical smoke smell

## 2021-11-23 PROBLEM — F11.93 HEROIN WITHDRAWAL (H): Status: ACTIVE | Noted: 2021-11-23

## 2021-12-14 ENCOUNTER — MYC MEDICAL ADVICE (OUTPATIENT)
Dept: FAMILY MEDICINE | Facility: CLINIC | Age: 51
End: 2021-12-14
Payer: COMMERCIAL

## 2021-12-14 DIAGNOSIS — J30.1 ALLERGIC RHINITIS DUE TO POLLEN, UNSPECIFIED SEASONALITY: ICD-10-CM

## 2021-12-15 ENCOUNTER — HOSPITAL ENCOUNTER (OUTPATIENT)
Facility: AMBULATORY SURGERY CENTER | Age: 51
End: 2021-12-15
Attending: INTERNAL MEDICINE
Payer: COMMERCIAL

## 2021-12-15 ENCOUNTER — TELEPHONE (OUTPATIENT)
Dept: GASTROENTEROLOGY | Facility: CLINIC | Age: 51
End: 2021-12-15
Payer: COMMERCIAL

## 2021-12-15 NOTE — TELEPHONE ENCOUNTER
Patient requesting Flonase Rx be changed to 2 sprays in both nostrils daily.     fluticasone (FLONASE) 50 MCG/ACT nasal spray 16 g 3 10/29/2021  --   Sig - Route: Spray 1 spray into both nostrils daily - Both Nostrils   Sent to pharmacy as: Fluticasone Propionate 50 MCG/ACT Nasal Suspension (FLONASE)   Class: E-Prescribe   Order: [id number]   E-Prescribing Status: Receipt confirmed by pharmacy (10/29/2021  1:47 PM CDT)

## 2021-12-15 NOTE — TELEPHONE ENCOUNTER
Screening Questions  1. Are you active on mychart? YES    2. What insurance is in the chart? JESSICA MERAZ    2.  Ordering/Referring Provider: Chip Whitlock MD    3. BMI 25.1, If greater than 40 review exclusion criteria    4.  Respiratory Screening (If yes to any of the following Hospital setting only):     Do you use daily home oxygen? NO  Do you have mod to severe Obstructive Sleep Apnea? NO   Do you have Pulmonary Hypertension? NO   Do you have UNCONTROLLED asthma? NO    5. Have you had a heart or lung transplant (If yes, please review exclusion criteria) ? NO    6. Are you currently on dialysis or have chronic kidney disease? NO    7. Have you had a stroke or Transient ischemic attack (TIA) within 6 months? NO    8. In the past 6 months, have you had any heart related issues including cardiomyopathy or heart attack? NO                 If yes, did it require cardiac stenting or other implantable device?NO      9. Do you have any implantable devices in your body (pacemaker, defib, LVAD)? NO    10. Do you take nitroglycerin? If yes, how often? NO    11. Are you currently taking any blood thinners?NO    12. Are you a diabetic? NO    13. (Females) Are you currently pregnant? N/A  If yes, how many weeks?      15. Are you taking any prescription pain medications on a routine schedule? NO If yes, MAC sedation.    16. Do you have any chemical dependencies such as alcohol, street drugs, or methadone? NOIf yes, MAC sedation.    17. Do you have any history of post-traumatic stress syndrome, severe anxiety or history of psychosis? ANXIETY     18. Do you transfer independently? YES    19.  Do you have any issues with constipation? YES    20. Preferred Pharmacy for Pre Prescription      Scheduling Details    Which Colonoscopy Prep was Sent?: EXTENED  Procedure Scheduled: COLON  Surgeon: NAYELI   Date of Procedure: 1/14/2022  Location:   Caller (Please ask for phone number if not scheduled by patient): WILLAM Do  Type: CS  Conscious Sedation- Needs  for 6 hours after the procedure  MAC/General-Needs  for 24 hours after procedure    Pre-op Required at [facility], [place], [place] and OR for MAC sedation:   (if yes advise patient they will need a pre-op prior to procedure)      Is patient on blood thinners? -NO (If yes- inform patient to follow up with PCP or provider for follow up instructions)     Informed patient they will need an adult  YES  Cannot take any type of public or medical transportation alone    Pre-Procedure Covid test to be completed at [facility] or Externally: [facility]    Confirmed Nurse will call to complete assessment YES    Additional comments:

## 2021-12-16 RX ORDER — FLUTICASONE PROPIONATE 50 MCG
1 SPRAY, SUSPENSION (ML) NASAL 2 TIMES DAILY
Qty: 16 G | Refills: 3 | Status: SHIPPED | OUTPATIENT
Start: 2021-12-16

## 2021-12-16 NOTE — TELEPHONE ENCOUNTER
RECORDS RECEIVED FROM: Internal   Appt Date: 12.22.2021    NOTES STATUS DETAILS   OFFICE NOTE from referring provider Internal 10.19.2021 Chip Whitlock MD   OFFICE NOTES from other specialists Internal 11.22.2021 Jayde Cazares APRN CNP     DISCHARGE SUMMARY from hospital N/A    MEDICATION LIST Internal    LIVER BIOSPY (IF APPLICABLE)      PATHOLOGY REPORTS  N/A    IMAGING     ENDOSCOPY (IF AVAILABLE) N/A    COLONOSCOPY (IF AVAILABLE) N/A    ULTRASOUND LIVER N/A    CT OF ABDOMEN N/A    MRI OF LIVER N/A    FIBROSCAN, US ELASTOGRAPHY, FIBROSIS SCAN, MR ELASTOGRAPHY N/A    LABS     HEPATIC PANEL (LIVER PANEL) N/A    BASIC METABOLIC PANEL Care Everywhere 01.15.2016   COMPLETE METABOLIC PANEL Care Everywhere 01.15.2016   COMPLETE BLOOD COUNT (CBC) Care Everywhere 01.015.2016   INTERNATIONAL NORMALIZED RATIO (INR) N/A    HEPATITIS C ANTIBODY Internal 10.14.2021   HEPATITIS C VIRAL LOAD/PCR N/A    HEPATITIS C GENOTYPE N/A    HEPATITIS B SURFACE ANTIGEN N/A    HEPATITIS B SURFACE ANTIBODY N/A    HEPATITIS B DNA QUANT LEVEL N/A    HEPATITIS B CORE ANTIBODY N/A

## 2021-12-20 DIAGNOSIS — Z11.59 ENCOUNTER FOR SCREENING FOR OTHER VIRAL DISEASES: ICD-10-CM

## 2021-12-22 ENCOUNTER — VIRTUAL VISIT (OUTPATIENT)
Dept: GASTROENTEROLOGY | Facility: CLINIC | Age: 51
End: 2021-12-22
Attending: FAMILY MEDICINE
Payer: COMMERCIAL

## 2021-12-22 ENCOUNTER — PRE VISIT (OUTPATIENT)
Dept: GASTROENTEROLOGY | Facility: CLINIC | Age: 51
End: 2021-12-22
Payer: COMMERCIAL

## 2021-12-22 DIAGNOSIS — B18.2 CHRONIC HEPATITIS C WITHOUT HEPATIC COMA (H): ICD-10-CM

## 2021-12-22 PROCEDURE — G0463 HOSPITAL OUTPT CLINIC VISIT: HCPCS | Mod: PN,RTG | Performed by: PHYSICIAN ASSISTANT

## 2021-12-22 PROCEDURE — 99204 OFFICE O/P NEW MOD 45 MIN: CPT | Mod: 95 | Performed by: PHYSICIAN ASSISTANT

## 2021-12-22 ASSESSMENT — PAIN SCALES - GENERAL: PAINLEVEL: EXTREME PAIN (8)

## 2021-12-22 NOTE — PROGRESS NOTES
Matthew is a 51 year old who is being evaluated via a billable video visit.      How would you like to obtain your AVS? MyChart  If the video visit is dropped, the invitation should be resent by: Text to cell phone: [phone]  Will anyone else be joining your video visit? No       Video Start Time: 2:01 pm   Video-Visit Details    Type of service:  Video Visit    Video End Time: 2:18     Originating Location (pt. Location):Home     Distant Location (provider location):  [facility] [place]     Platform used for Video Visit: [facility]     Hepatology Clinic Note  Matthew Lawler   Date of Birth 1970  Date of Service 12/22/2021    REASON FOR CONSULTATION: Hepatitis C  REFERRING PROVIDER: Chip Whitlock MD          Assessment/plan:   Matthew Lawler is a 51 year old male with Hepatitis C, genotype 2b, treatment naive. We discussed the natural course of Hepatitis C virus and the benefits of treating the disease. We discussed the treatment regimen and medication side effects. Patient would be a good candidate for Hepatitis C treatment to prevent worsening fibrosis and to prevent extrahepatic manifestations of the disease.     - Currently on low dose methadone for maintenance for opoid disorder. Declines any risk reduction strategies: clean needles/syringes, naloxone or condoms.   - Hep B serologies   - Hepatic panel, CBC, INR   - Will plan to send prescription for Hepatitis C pending Hep B serologies   - Repeat HCV RNA at the end of treatment and 12-weeks after finishing treatment to determine SVR  - If patient achieves SVR, he does not need regular follow-up in Hepatology Clinic.   - Follow-up in Hepatology Clinic as needed    Clemente Corbin PA-C   [facility]    -----------------------------------------------------       HPI:   Matthew Lawler is a 51 year old male  presenting for evaluation and treatment of Hepatitis C.     Hepatitis C   -Genotype 2b  -Diagnosed:  October 2021  -History: Hx of IVDU   -Prior biopsy: no   -Prior treatments: naive    Patient states he was just recently diagnosed with Hepatitis C. States he likely acquired the virus through history of IVDU within the last three years. He denies previous treatment. Will be going to methadone clinic within the next month. Currently taking about 40 mg daily for maintenance.     Appetite is normal. Weight has been pretty stable, weight is up a bit. Having regular bowel movements.     Patient denies jaundice, lower extremity edema, abdominal distension or confusion.      Patient also denies melena, hematochezia or hematemesis. Patient denies weight loss, fevers, sweats or chills.    PMH: seasonal allergies     SMH: carpal tunnel, right elbwo tendon surgery     Medications: Current methadone     Currently smoke <1 ppd. No alcohol since late 90's. Last used used a few weeks ago. Not currently working..Staying in a hotel currently with wife, recently evicted. Transportation is limited at this time. Brother with liver disease, cirrhosis and liver cancer.     Lab work-up thus far:   HCV antibody: reactive  HCV RNA: 16 million   HBV SAb:   HBV SAg:   HBV CAb:   HIV: nonrective      Medical hx Surgical hx   Past Medical History:   Diagnosis Date     Anxiety      Chronic pain      COPD (chronic obstructive pulmonary disease) (H)      Degeneration of lumbar or lumbosacral intervertebral disc     Past Surgical History:   Procedure Laterality Date     INJECT EPIDURAL LUMBAR  06/16/2014    [facility]     SURGICAL HISTORY OF -       Bilateral carpal tunnel release     SURGICAL HISTORY OF -       Tendon repair right elbow                 Medications:     Current Outpatient Medications   Medication     fluticasone (FLONASE) 50 MCG/ACT nasal spray     tamsulosin (FLOMAX) 0.4 MG capsule     ALPRAZolam (XANAX) 2 MG tablet     DULoxetine (CYMBALTA) 30 MG capsule     No current facility-administered medications for this visit.             Allergies:     Allergies   Allergen Reactions     Penicillins      Morphine Hcl Rash            Social History:     Social History     Socioeconomic History     Marital status: Single     Spouse name: Not on file     Number of children: Not on file     Years of education: Not on file     Highest education level: Not on file   Occupational History     Not on file   Tobacco Use     Smoking status: Current Every Day Smoker     Packs/day: 0.50     Years: 19.00     Pack years: 9.50     Types: Cigarettes     Smokeless tobacco: Never Used     Tobacco comment: 1/2 pack per day   Vaping Use     Vaping Use: Former     Substances: Nicotine     Devices: Disposable, Pre-filled or refillable cartridge, Refillable tank   Substance and Sexual Activity     Alcohol use: No     Drug use: Yes     Comment: Last Used: Heroin 11/20/21     Sexual activity: Yes     Partners: Female     Birth control/protection: Surgical     Comment: vasectomy   Other Topics Concern     Parent/sibling w/ CABG, MI or angioplasty before 65F 55M? No   Social History Narrative     Not on file     Social Determinants of Health     Financial Resource Strain: Not on file   Food Insecurity: Not on file   Transportation Needs: Not on file   Physical Activity: Not on file   Stress: Not on file   Social Connections: Not on file   Intimate Partner Violence: Not on file   Housing Stability: Not on file            Family History:     Family History   Problem Relation Age of Onset     Cancer Father         bladder     Cancer Brother      Asthma No family hx of      C.A.D. No family hx of      Diabetes No family hx of      Hypertension No family hx of      Cerebrovascular Disease No family hx of      Breast Cancer No family hx of      Cancer - colorectal No family hx of      Prostate Cancer No family hx of      Alcohol/Drug No family hx of      Anesthesia Reaction No family hx of      Arthritis No family hx of      Alzheimer Disease No family hx of      Allergies  No family hx of      Blood Disease No family hx of      Cardiovascular No family hx of      Circulatory No family hx of      Congenital Anomalies No family hx of      Connective Tissue Disorder No family hx of      Endocrine Disease No family hx of      Depression No family hx of      Eye Disorder No family hx of      Genetic Disorder No family hx of      Gastrointestinal Disease No family hx of      Genitourinary Problems No family hx of      Gynecology No family hx of      Heart Disease No family hx of      Lipids No family hx of      Musculoskeletal Disorder No family hx of      Neurologic Disorder No family hx of      Obesity No family hx of      Osteoporosis No family hx of      Psychotic Disorder No family hx of      Respiratory No family hx of      Thyroid Disease No family hx of      Hearing Loss No family hx of      Family History Negative No family hx of      Coronary Artery Disease No family hx of      Hyperlipidemia No family hx of      Ovarian Cancer No family hx of      Depression/Anxiety No family hx of      Thyroid Disease No family hx of      Chemical Addiction No family hx of      Known Genetic Syndrome No family hx of      Anxiety Disorder No family hx of      Mental Illness No family hx of      Substance Abuse No family hx of      Other Cancer No family hx of      Colon Cancer No family hx of             Review of Systems:   GEN: See HPI  HEENT: No change in vision or hearing, mouth sores, dysphagia, lymph nodes  Resp: No shortness of breath, coughing, hx of asthma  CV: No chest pain, palpitations, syncope   GI: See HPI  : No dysuria, history of stones, urine color    Skin: No rash; no pruritus or psoriasis  MS: No arthralgias, myalgias, joint swelling  Neuro: No memory changes, confusion, numbness    Heme: No difficulty clotting, bruising, bleeding  Psych:  No anxiety, depression, agitation          Physical Exam:     GENERAL: healthy, alert and no distress  EYES: Eyes grossly normal to  inspection, conjunctivae and sclerae normal  RESP: no audible wheeze, cough, or visible cyanosis.  No visible retractions or increased work of breathing.  Able to speak fully in complete sentences  NEURO: Cranial nerves grossly intact, mentation intact and speech normal  PSYCH: mentation appears normal, affect normal/bright, judgement and insight intact, normal speech and appearance well-groomed         Data:   Reviewed in person and significant for:    Lab Results   Component Value Date     09/28/2015      Lab Results   Component Value Date    POTASSIUM 4.3 09/28/2015     Lab Results   Component Value Date    CHLORIDE 106 09/28/2015     Lab Results   Component Value Date    CO2 27 09/28/2015     Lab Results   Component Value Date    BUN 6 09/28/2015     Lab Results   Component Value Date    CR 0.99 09/28/2015       No results found for: WBC  No results found for: HGB  No results found for: HCT  No results found for: MCV  No results found for: PLT    Lab Results   Component Value Date    AST 17 09/28/2015     Lab Results   Component Value Date    ALT 35 09/28/2015     No results found for: BILICONJ   Lab Results   Component Value Date    BILITOTAL 0.9 09/28/2015       Lab Results   Component Value Date    ALBUMIN 4.2 09/28/2015     Lab Results   Component Value Date    PROTTOTAL 8.1 09/28/2015      Lab Results   Component Value Date    ALKPHOS 86 09/28/2015       No results found for: INR

## 2021-12-22 NOTE — PROGRESS NOTES
Hepatology Clinic Note  Matthew Lawler   Date of Birth 1970  Date of Service 12/22/2021    REASON FOR CONSULTATION: Hepatitis C  REFERRING PROVIDER:***         Assessment/plan:   Matthew Lawler is a 51 year old male with Hepatitis C, genotype 2b, treatment ***.     Currently there are no biochemical or physical signs of cirrhosis. We discussed the natural course of Hepatitis C virus and the benefits of treating the disease. We discussed the treatment regimen and medication side effects. Patient would be a *** candidate for Hepatitis C treatment to prevent worsening fibrosis and to prevent extrahepatic manifestations of the disease.         - Hepatitis B serologies, hepatic panel   - Fibroscan today to evaluate for any degree of fibrosis   - Will plan to send prescription for Hepatitis C pending genotype and fibrosis  - Repeat HCV RNA at the end of treatment and 12-weeks after finishing treatment to determine SVR  - If patient achieves SVR and Fibrosis Stage 2 or less, *** does not need regular follow-up in Hepatology Clinic.   - Follow-up in Hepatology Clinic as needed    Clemente Corbin PA-C   [facility]    -----------------------------------------------------       HPI:   Matthew Lawler is a 51 year old male  presenting for evaluation and treatment of Hepatitis C.     Hepatitis C   -Genotype:  2b  -Diagnosed: 2021  -History: Hx of IVDU  -Prior biopsy  -Prior treatments:    Patient reports last using in November.     Patient denies jaundice, lower extremity edema, abdominal distension or confusion.      Patient also denies melena, hematochezia or hematemesis.    Patient denies weight loss, fevers, sweats or chills.    PMH: BPH, history of opoid abuse, chronic pain, COPD    SMH: Carpal Dayan    Medications:   See below     No previous tobacco use. ETOH includes ***. No previous IV/IN drug use.    Currently works*** . Patient currently lives***    Family history***    Lab work-up thus  far:   HCV RNA 14 million   HAV Ab***  HBV SAb***  HBV SAg***  HBV CAb***  HIV nonreactive      Medical hx Surgical hx   Past Medical History:   Diagnosis Date     Anxiety      Chronic pain      COPD (chronic obstructive pulmonary disease) (H)      Degeneration of lumbar or lumbosacral intervertebral disc     Past Surgical History:   Procedure Laterality Date     INJECT EPIDURAL LUMBAR  06/16/2014    [facility]     SURGICAL HISTORY OF -       Bilateral carpal tunnel release     SURGICAL HISTORY OF -       Tendon repair right elbow                 Medications:     Current Outpatient Medications   Medication     ALPRAZolam (XANAX) 2 MG tablet     DULoxetine (CYMBALTA) 30 MG capsule     fluticasone (FLONASE) 50 MCG/ACT nasal spray     tamsulosin (FLOMAX) 0.4 MG capsule     No current facility-administered medications for this visit.            Allergies:     Allergies   Allergen Reactions     Penicillins      Morphine Hcl Rash            Social History:     Social History     Socioeconomic History     Marital status: Single     Spouse name: Not on file     Number of children: Not on file     Years of education: Not on file     Highest education level: Not on file   Occupational History     Not on file   Tobacco Use     Smoking status: Current Every Day Smoker     Packs/day: 0.50     Years: 19.00     Pack years: 9.50     Types: Cigarettes     Smokeless tobacco: Never Used     Tobacco comment: 1/2 pack per day   Vaping Use     Vaping Use: Former     Substances: Nicotine     Devices: Disposable, Pre-filled or refillable cartridge, Refillable tank   Substance and Sexual Activity     Alcohol use: No     Drug use: Yes     Comment: Last Used: Heroin 11/20/21     Sexual activity: Yes     Partners: Female     Birth control/protection: Surgical     Comment: vasectomy   Other Topics Concern     Parent/sibling w/ CABG, MI or angioplasty before 65F 55M? No   Social History Narrative     Not on file     Social Determinants of  Health     Financial Resource Strain: Not on file   Food Insecurity: Not on file   Transportation Needs: Not on file   Physical Activity: Not on file   Stress: Not on file   Social Connections: Not on file   Intimate Partner Violence: Not on file   Housing Stability: Not on file            Family History:     Family History   Problem Relation Age of Onset     Cancer Father         bladder     Cancer Brother      Asthma No family hx of      C.A.D. No family hx of      Diabetes No family hx of      Hypertension No family hx of      Cerebrovascular Disease No family hx of      Breast Cancer No family hx of      Cancer - colorectal No family hx of      Prostate Cancer No family hx of      Alcohol/Drug No family hx of      Anesthesia Reaction No family hx of      Arthritis No family hx of      Alzheimer Disease No family hx of      Allergies No family hx of      Blood Disease No family hx of      Cardiovascular No family hx of      Circulatory No family hx of      Congenital Anomalies No family hx of      Connective Tissue Disorder No family hx of      Endocrine Disease No family hx of      Depression No family hx of      Eye Disorder No family hx of      Genetic Disorder No family hx of      Gastrointestinal Disease No family hx of      Genitourinary Problems No family hx of      Gynecology No family hx of      Heart Disease No family hx of      Lipids No family hx of      Musculoskeletal Disorder No family hx of      Neurologic Disorder No family hx of      Obesity No family hx of      Osteoporosis No family hx of      Psychotic Disorder No family hx of      Respiratory No family hx of      Thyroid Disease No family hx of      Hearing Loss No family hx of      Family History Negative No family hx of      Coronary Artery Disease No family hx of      Hyperlipidemia No family hx of      Ovarian Cancer No family hx of      Depression/Anxiety No family hx of      Thyroid Disease No family hx of      Chemical Addiction No  family hx of      Known Genetic Syndrome No family hx of      Anxiety Disorder No family hx of      Mental Illness No family hx of      Substance Abuse No family hx of      Other Cancer No family hx of      Colon Cancer No family hx of             Review of Systems:   GEN: See HPI  HEENT: No change in vision or hearing, mouth sores, dysphagia, lymph nodes  Resp: No shortness of breath, coughing, hx of asthma  CV: No chest pain, palpitations, syncope   GI: See HPI  : No dysuria, history of stones, urine color    Skin: No rash; no pruritus or psoriasis  MS: No arthralgias, myalgias, joint swelling  Neuro: No memory changes, confusion, numbness    Heme: No difficulty clotting, bruising, bleeding  Psych:  No anxiety, depression, agitation          Physical Exam:   VS:  There were no vitals taken for this visit.      Gen: A&Ox3, NAD, well developed  HEENT: non-icteric ***, no cervical lymphadenopathy, no lesions or ulcers in oropharynx  CV: RRR, no overt murmurs  Lung: CTA Bilatererally, no wheezing or crackles.   Lym- no palpable lymphadenopathy  Abd: soft, NT, ND *** no palpable splenomegaly, liver is not palpable.   Ext: no edema, intact pulses.   Skin: No rash***, no palmar erythema, telangiectasias or jaundice  Neuro: grossly intact, no asterixis   Psych: appropriate mood and affects         Data:   Reviewed in person and significant for:    Lab Results   Component Value Date     09/28/2015      Lab Results   Component Value Date    POTASSIUM 4.3 09/28/2015     Lab Results   Component Value Date    CHLORIDE 106 09/28/2015     Lab Results   Component Value Date    CO2 27 09/28/2015     Lab Results   Component Value Date    BUN 6 09/28/2015     Lab Results   Component Value Date    CR 0.99 09/28/2015       No results found for: WBC  No results found for: HGB  No results found for: HCT  No results found for: MCV  No results found for: PLT    Lab Results   Component Value Date    AST 17 09/28/2015     Lab  Results   Component Value Date    ALT 35 09/28/2015     No results found for: BILICONJ   Lab Results   Component Value Date    BILITOTAL 0.9 09/28/2015       Lab Results   Component Value Date    ALBUMIN 4.2 09/28/2015     Lab Results   Component Value Date    PROTTOTAL 8.1 09/28/2015      Lab Results   Component Value Date    ALKPHOS 86 09/28/2015       No results found for: INR

## 2021-12-22 NOTE — LETTER
12/22/2021     RE: Matthew Lawler  [address]  [facility] [address]    Dear Colleague,    Thank you for referring your patient, Matthew Lawler, to the [facility] [place]. Please see a copy of my visit note below.    Matthew is a 51 year old who is being evaluated via a billable video visit.      How would you like to obtain your AVS? MyChart  If the video visit is dropped, the invitation should be resent by: Text to cell phone: [phone]  Will anyone else be joining your video visit? No       Video Start Time: 2:01 pm   Video-Visit Details    Type of service:  Video Visit    Video End Time: 2:18     Originating Location (pt. Location):Home     Distant Location (provider location):  [facility]     Platform used for Video Visit: [facility]     Hepatology Clinic Note  Matthew Lawler   Date of Birth 1970  Date of Service 12/22/2021    REASON FOR CONSULTATION: Hepatitis C  REFERRING PROVIDER: Chip Whitlock MD          Assessment/plan:   Matthew Lawler is a 51 year old male with Hepatitis C, genotype 2b, treatment naive. We discussed the natural course of Hepatitis C virus and the benefits of treating the disease. We discussed the treatment regimen and medication side effects. Patient would be a good candidate for Hepatitis C treatment to prevent worsening fibrosis and to prevent extrahepatic manifestations of the disease.     - Currently on low dose methadone for maintenance for opoid disorder. Declines any risk reduction strategies: clean needles/syringes, naloxone or condoms.   - Hep B serologies   - Hepatic panel, CBC, INR   - Will plan to send prescription for Hepatitis C pending Hep B serologies   - Repeat HCV RNA at the end of treatment and 12-weeks after finishing treatment to determine SVR  - If patient achieves SVR, he does not need regular follow-up in Hepatology Clinic.   - Follow-up in Hepatology Clinic as  needed    Clemente Corbin PA-C   [facility]    -----------------------------------------------------       HPI:   Matthew Lawler is a 51 year old male  presenting for evaluation and treatment of Hepatitis C.     Hepatitis C   -Genotype 2b  -Diagnosed: October 2021  -History: Hx of IVDU   -Prior biopsy: no   -Prior treatments: naive    Patient states he was just recently diagnosed with Hepatitis C. States he likely acquired the virus through history of IVDU within the last three years. He denies previous treatment. Will be going to methadone clinic within the next month. Currently taking about 40 mg daily for maintenance.     Appetite is normal. Weight has been pretty stable, weight is up a bit. Having regular bowel movements.     Patient denies jaundice, lower extremity edema, abdominal distension or confusion.      Patient also denies melena, hematochezia or hematemesis. Patient denies weight loss, fevers, sweats or chills.    PMH: seasonal allergies     SMH: carpal tunnel, right elbwo tendon surgery     Medications: Current methadone     Currently smoke <1 ppd. No alcohol since late 90's. Last used used a few weeks ago. Not currently working..Staying in a hotel currently with wife, recently evicted. Transportation is limited at this time. Brother with liver disease, cirrhosis and liver cancer.     Lab work-up thus far:   HCV antibody: reactive  HCV RNA: 16 million   HBV SAb:   HBV SAg:   HBV CAb:   HIV: nonrective      Medical hx Surgical hx   Past Medical History:   Diagnosis Date     Anxiety      Chronic pain      COPD (chronic obstructive pulmonary disease) (H)      Degeneration of lumbar or lumbosacral intervertebral disc     Past Surgical History:   Procedure Laterality Date     INJECT EPIDURAL LUMBAR  06/16/2014    [facility]     SURGICAL HISTORY OF -       Bilateral carpal tunnel release     SURGICAL HISTORY OF -       Tendon repair right elbow                 Medications:      Current Outpatient Medications   Medication     fluticasone (FLONASE) 50 MCG/ACT nasal spray     tamsulosin (FLOMAX) 0.4 MG capsule     ALPRAZolam (XANAX) 2 MG tablet     DULoxetine (CYMBALTA) 30 MG capsule     No current facility-administered medications for this visit.            Allergies:     Allergies   Allergen Reactions     Penicillins      Morphine Hcl Rash            Social History:     Social History     Socioeconomic History     Marital status: Single     Spouse name: Not on file     Number of children: Not on file     Years of education: Not on file     Highest education level: Not on file   Occupational History     Not on file   Tobacco Use     Smoking status: Current Every Day Smoker     Packs/day: 0.50     Years: 19.00     Pack years: 9.50     Types: Cigarettes     Smokeless tobacco: Never Used     Tobacco comment: 1/2 pack per day   Vaping Use     Vaping Use: Former     Substances: Nicotine     Devices: Disposable, Pre-filled or refillable cartridge, Refillable tank   Substance and Sexual Activity     Alcohol use: No     Drug use: Yes     Comment: Last Used: Heroin 11/20/21     Sexual activity: Yes     Partners: Female     Birth control/protection: Surgical     Comment: vasectomy   Other Topics Concern     Parent/sibling w/ CABG, MI or angioplasty before 65F 55M? No   Social History Narrative     Not on file     Social Determinants of Health     Financial Resource Strain: Not on file   Food Insecurity: Not on file   Transportation Needs: Not on file   Physical Activity: Not on file   Stress: Not on file   Social Connections: Not on file   Intimate Partner Violence: Not on file   Housing Stability: Not on file            Family History:     Family History   Problem Relation Age of Onset     Cancer Father         bladder     Cancer Brother      Asthma No family hx of      C.A.D. No family hx of      Diabetes No family hx of      Hypertension No family hx of      Cerebrovascular Disease No family hx  of      Breast Cancer No family hx of      Cancer - colorectal No family hx of      Prostate Cancer No family hx of      Alcohol/Drug No family hx of      Anesthesia Reaction No family hx of      Arthritis No family hx of      Alzheimer Disease No family hx of      Allergies No family hx of      Blood Disease No family hx of      Cardiovascular No family hx of      Circulatory No family hx of      Congenital Anomalies No family hx of      Connective Tissue Disorder No family hx of      Endocrine Disease No family hx of      Depression No family hx of      Eye Disorder No family hx of      Genetic Disorder No family hx of      Gastrointestinal Disease No family hx of      Genitourinary Problems No family hx of      Gynecology No family hx of      Heart Disease No family hx of      Lipids No family hx of      Musculoskeletal Disorder No family hx of      Neurologic Disorder No family hx of      Obesity No family hx of      Osteoporosis No family hx of      Psychotic Disorder No family hx of      Respiratory No family hx of      Thyroid Disease No family hx of      Hearing Loss No family hx of      Family History Negative No family hx of      Coronary Artery Disease No family hx of      Hyperlipidemia No family hx of      Ovarian Cancer No family hx of      Depression/Anxiety No family hx of      Thyroid Disease No family hx of      Chemical Addiction No family hx of      Known Genetic Syndrome No family hx of      Anxiety Disorder No family hx of      Mental Illness No family hx of      Substance Abuse No family hx of      Other Cancer No family hx of      Colon Cancer No family hx of             Review of Systems:   GEN: See HPI  HEENT: No change in vision or hearing, mouth sores, dysphagia, lymph nodes  Resp: No shortness of breath, coughing, hx of asthma  CV: No chest pain, palpitations, syncope   GI: See HPI  : No dysuria, history of stones, urine color    Skin: No rash; no pruritus or psoriasis  MS: No  arthralgias, myalgias, joint swelling  Neuro: No memory changes, confusion, numbness    Heme: No difficulty clotting, bruising, bleeding  Psych:  No anxiety, depression, agitation          Physical Exam:     GENERAL: healthy, alert and no distress  EYES: Eyes grossly normal to inspection, conjunctivae and sclerae normal  RESP: no audible wheeze, cough, or visible cyanosis.  No visible retractions or increased work of breathing.  Able to speak fully in complete sentences  NEURO: Cranial nerves grossly intact, mentation intact and speech normal  PSYCH: mentation appears normal, affect normal/bright, judgement and insight intact, normal speech and appearance well-groomed         Data:   Reviewed in person and significant for:    Lab Results   Component Value Date     09/28/2015      Lab Results   Component Value Date    POTASSIUM 4.3 09/28/2015     Lab Results   Component Value Date    CHLORIDE 106 09/28/2015     Lab Results   Component Value Date    CO2 27 09/28/2015     Lab Results   Component Value Date    BUN 6 09/28/2015     Lab Results   Component Value Date    CR 0.99 09/28/2015       No results found for: WBC  No results found for: HGB  No results found for: HCT  No results found for: MCV  No results found for: PLT    Lab Results   Component Value Date    AST 17 09/28/2015     Lab Results   Component Value Date    ALT 35 09/28/2015     No results found for: BILICONJ   Lab Results   Component Value Date    BILITOTAL 0.9 09/28/2015       Lab Results   Component Value Date    ALBUMIN 4.2 09/28/2015     Lab Results   Component Value Date    PROTTOTAL 8.1 09/28/2015      Lab Results   Component Value Date    ALKPHOS 86 09/28/2015     No results found for: INR    Again, thank you for allowing me to participate in the care of your patient.      Sincerely,    Clemente Corbin PA-C

## 2022-01-07 RX ORDER — PROCHLORPERAZINE MALEATE 10 MG
10 TABLET ORAL EVERY 6 HOURS PRN
Status: CANCELLED | OUTPATIENT
Start: 2022-01-07

## 2022-01-07 RX ORDER — ONDANSETRON 4 MG/1
4 TABLET, ORALLY DISINTEGRATING ORAL EVERY 6 HOURS PRN
Status: CANCELLED | OUTPATIENT
Start: 2022-01-07

## 2022-01-07 RX ORDER — NALOXONE HYDROCHLORIDE 0.4 MG/ML
0.4 INJECTION, SOLUTION INTRAMUSCULAR; INTRAVENOUS; SUBCUTANEOUS
Status: CANCELLED | OUTPATIENT
Start: 2022-01-07

## 2022-01-07 RX ORDER — NALOXONE HYDROCHLORIDE 0.4 MG/ML
0.2 INJECTION, SOLUTION INTRAMUSCULAR; INTRAVENOUS; SUBCUTANEOUS
Status: CANCELLED | OUTPATIENT
Start: 2022-01-07

## 2022-01-07 RX ORDER — ONDANSETRON 2 MG/ML
4 INJECTION INTRAMUSCULAR; INTRAVENOUS
Status: CANCELLED | OUTPATIENT
Start: 2022-01-07

## 2022-01-07 RX ORDER — LIDOCAINE 40 MG/G
CREAM TOPICAL
Status: CANCELLED | OUTPATIENT
Start: 2022-01-07

## 2022-01-07 RX ORDER — FLUMAZENIL 0.1 MG/ML
0.2 INJECTION, SOLUTION INTRAVENOUS
Status: CANCELLED | OUTPATIENT
Start: 2022-01-07 | End: 2022-01-07

## 2022-01-07 RX ORDER — ONDANSETRON 2 MG/ML
4 INJECTION INTRAMUSCULAR; INTRAVENOUS EVERY 6 HOURS PRN
Status: CANCELLED | OUTPATIENT
Start: 2022-01-07

## 2022-01-11 ENCOUNTER — TELEPHONE (OUTPATIENT)
Dept: GASTROENTEROLOGY | Facility: CLINIC | Age: 52
End: 2022-01-11
Payer: COMMERCIAL

## 2022-01-11 NOTE — TELEPHONE ENCOUNTER
I left him a message per message from Nursing staff at   Karen, Maday LUX RN  P Endoscopy Scheduling Pool; P  Endo Gi Rn  Anesthesia reviewed patient's history and recommends his colonoscopy be done under MAC.  Procedure can still be done here at [facility] but it does need to be rescheduled for a time that anesthesia is available..  Also, he will need to be told that he will need a pre op physical.   We've left 2 messages but we have not been able to get a hold patient.     Thank you.

## 2022-01-13 ENCOUNTER — TELEPHONE (OUTPATIENT)
Dept: GASTROENTEROLOGY | Facility: CLINIC | Age: 52
End: 2022-01-13
Payer: COMMERCIAL

## 2022-01-13 NOTE — TELEPHONE ENCOUNTER
Caller: myself  Procedure: Colon    Date, Location, and Surgeon of Procedure Cancelled: 1/14/22 Annalisa     Ordering Provider:    Reason for cancel (please be detailed, any staff messages or encounters to note?): can't be done with CS have left him numerous messages  Maday Jones, Yury Lamb  Good question.  I would take him off the schedule because his procedure can't be done that day.   Thank you!             Previous Messages       ----- Message -----   From: Yury Andre   Sent: 1/11/2022   3:56 PM CST   To: Maday Jones RN,  Endo Gi Rn   Subject: RE: Reschedule as a MAC                           I just called him and had to leave him a message as well to call and get this case reschedule. Do you want me to remove form the snapboard for Friday or keep on in case he calls back to get it rescheduled to another day with [facility]? Thank you!   ----- Message -----   From: Maday Jones RN   Sent: 1/11/2022   3:26 PM CST   To: Mg Stewart Gi Rn, Endoscopy Scheduling Pool   Subject: Reschedule as a MAC                               Anesthesia reviewed patient's history and recommends his colonoscopy be done under [facility].  Procedure can still be done here at [place] but it does need to be rescheduled for a time that anesthesia is available..  Also, he will need to be told that he will need a pre op physical.   We've left 2 messages but we have not been able to get a hold patient.     Thank you.

## 2022-03-11 ENCOUNTER — MYC MEDICAL ADVICE (OUTPATIENT)
Dept: FAMILY MEDICINE | Facility: CLINIC | Age: 52
End: 2022-03-11
Payer: COMMERCIAL

## 2022-03-11 ENCOUNTER — E-VISIT (OUTPATIENT)
Dept: FAMILY MEDICINE | Facility: CLINIC | Age: 52
End: 2022-03-11
Payer: COMMERCIAL

## 2022-03-11 DIAGNOSIS — J02.9 SORE THROAT: Primary | ICD-10-CM

## 2022-03-11 PROCEDURE — 99207 PR NON-BILLABLE SERV PER CHARTING: CPT | Performed by: FAMILY MEDICINE

## 2022-04-15 DIAGNOSIS — N40.1 BENIGN PROSTATIC HYPERPLASIA (BPH) WITH STRAINING ON URINATION: ICD-10-CM

## 2022-04-15 DIAGNOSIS — R39.16 BENIGN PROSTATIC HYPERPLASIA (BPH) WITH STRAINING ON URINATION: ICD-10-CM

## 2022-04-18 RX ORDER — TAMSULOSIN HYDROCHLORIDE 0.4 MG/1
CAPSULE ORAL
Qty: 30 CAPSULE | Refills: 3 | Status: SHIPPED | OUTPATIENT
Start: 2022-04-18 | End: 2023-01-06

## 2022-09-10 ENCOUNTER — HEALTH MAINTENANCE LETTER (OUTPATIENT)
Age: 52
End: 2022-09-10

## 2023-01-06 ENCOUNTER — TELEPHONE (OUTPATIENT)
Dept: FAMILY MEDICINE | Facility: CLINIC | Age: 53
End: 2023-01-06

## 2023-01-06 DIAGNOSIS — J30.1 ALLERGIC RHINITIS DUE TO POLLEN, UNSPECIFIED SEASONALITY: ICD-10-CM

## 2023-01-06 DIAGNOSIS — R39.16 BENIGN PROSTATIC HYPERPLASIA (BPH) WITH STRAINING ON URINATION: ICD-10-CM

## 2023-01-06 DIAGNOSIS — N40.1 BENIGN PROSTATIC HYPERPLASIA (BPH) WITH STRAINING ON URINATION: ICD-10-CM

## 2023-01-06 RX ORDER — TAMSULOSIN HYDROCHLORIDE 0.4 MG/1
0.4 CAPSULE ORAL DAILY
Qty: 30 CAPSULE | Refills: 0 | Status: SHIPPED | OUTPATIENT
Start: 2023-01-06 | End: 2023-02-15

## 2023-01-06 NOTE — TELEPHONE ENCOUNTER
Patient asking for a refill of flomax.  Stated last refill was from Dr Whitlock on Nov. 8th 2022.    Appointments in Next Year    Jan 26, 2023  2:00 PM  (Arrive by 1:40 PM)  Provider Visit with Amanuel Connor MD  [facility] [place] ([facility] ) [phone]          Nadia Aguilar RN  [facility] ~ Registered Nurse  Clinic Triage ~ [place] [facility] [place]  January 6, 2023

## 2023-01-06 NOTE — TELEPHONE ENCOUNTER
Refill given until his January appointment with me.    Inform patient.    Amanuel Connor MD  [facility]

## 2023-01-21 ENCOUNTER — HEALTH MAINTENANCE LETTER (OUTPATIENT)
Age: 53
End: 2023-01-21

## 2023-03-13 DIAGNOSIS — N40.1 BENIGN PROSTATIC HYPERPLASIA (BPH) WITH STRAINING ON URINATION: ICD-10-CM

## 2023-03-13 DIAGNOSIS — R39.16 BENIGN PROSTATIC HYPERPLASIA (BPH) WITH STRAINING ON URINATION: ICD-10-CM

## 2023-03-13 RX ORDER — TAMSULOSIN HYDROCHLORIDE 0.4 MG/1
0.4 CAPSULE ORAL DAILY
Qty: 30 CAPSULE | Refills: 0 | Status: SHIPPED | OUTPATIENT
Start: 2023-03-13 | End: 2023-05-12

## 2023-03-13 NOTE — LETTER
March 13, 2023      Matthew Lawler  [address]   [place] [address]              Dear Matthew,    We just wanted to let you know that we sent in a refill for your Tamsolusin but you are due for a office visit to establish care before your next refill is due. 30 days sent    Please give us a call at [phone] or use [employer] to schedule.     Have a great day.    Your [facility] Team

## 2023-05-12 DIAGNOSIS — N40.1 BENIGN PROSTATIC HYPERPLASIA (BPH) WITH STRAINING ON URINATION: ICD-10-CM

## 2023-05-12 DIAGNOSIS — R39.16 BENIGN PROSTATIC HYPERPLASIA (BPH) WITH STRAINING ON URINATION: ICD-10-CM

## 2023-05-12 RX ORDER — TAMSULOSIN HYDROCHLORIDE 0.4 MG/1
CAPSULE ORAL
Qty: 30 CAPSULE | Refills: 0 | Status: SHIPPED | OUTPATIENT
Start: 2023-05-12 | End: 2023-07-26

## 2023-05-12 NOTE — TELEPHONE ENCOUNTER
"Pending Prescriptions:                       Disp   Refills    tamsulosin (FLOMAX) 0.4 MG capsule [Pharma*30 cap*0        Sig: TAKE 1 CAPSULE BY MOUTH DAILY. DUE FOR OFFICE VISIT    Routing refill request to provider for review/approval because:  Labs not current:  BP and needs visit    Requested Prescriptions   Pending Prescriptions Disp Refills    tamsulosin (FLOMAX) 0.4 MG capsule [Pharmacy Med Name: TAMSULOSIN 0.4MG CAPSULES] 30 capsule 0     Sig: TAKE 1 CAPSULE BY MOUTH DAILY. DUE FOR OFFICE VISIT       Alpha Blockers Failed - 5/12/2023  5:49 AM        Failed - Blood pressure under 140/90 in past 12 months     BP Readings from Last 3 Encounters:   11/22/21 122/74   10/14/21 134/76   05/07/21 (!) 148/90                 Failed - Recent (12 mo) or future (30 days) visit within the authorizing provider's specialty     Patient has had an office visit with the authorizing provider or a provider within the authorizing providers department within the previous 12 mos or has a future within next 30 days. See \"Patient Info\" tab in inbasket, or \"Choose Columns\" in Meds & Orders section of the refill encounter.              Passed - Patient does not have Tadalafil, Vardenafil, or Sildenafil on their medication list        Passed - Medication is active on med list        Passed - Patient is 18 years of age or older             "

## 2023-05-12 NOTE — LETTER
May 15, 2023      Matthew Lawler  [address]   [place] [address]            Your provider has sent a yolanda refill for your tamsulosin (FLOMAX) 0.4 MG capsule. You are due for an office visit before any more refills will be sent.      Please call [phone] to schedule or you can schedule via [employer].      Have a good day,      [facility]

## 2023-07-25 DIAGNOSIS — N40.1 BENIGN PROSTATIC HYPERPLASIA (BPH) WITH STRAINING ON URINATION: ICD-10-CM

## 2023-07-25 DIAGNOSIS — R39.16 BENIGN PROSTATIC HYPERPLASIA (BPH) WITH STRAINING ON URINATION: ICD-10-CM

## 2023-07-25 RX ORDER — TAMSULOSIN HYDROCHLORIDE 0.4 MG/1
CAPSULE ORAL
Qty: 30 CAPSULE | Refills: 0 | OUTPATIENT
Start: 2023-07-25

## 2023-07-25 NOTE — TELEPHONE ENCOUNTER
"Pending Prescriptions:                       Disp   Refills    tamsulosin (FLOMAX) 0.4 MG capsule [Pharma*30 cap*0        Sig: TAKE 1 CAPSULE BY MOUTH DAILY    Routing refill request to provider for review/approval because:  See below.    PHQ-9 score:        10/14/2021    11:04 AM   PHQ   PHQ-9 Total Score 3   Q9: Thoughts of better off dead/self-harm past 2 weeks Not at all       Requested Prescriptions   Pending Prescriptions Disp Refills    tamsulosin (FLOMAX) 0.4 MG capsule [Pharmacy Med Name: TAMSULOSIN 0.4MG CAPSULES] 30 capsule 0     Sig: TAKE 1 CAPSULE BY MOUTH DAILY       Alpha Blockers Failed - 7/25/2023 11:32 AM        Failed - Blood pressure under 140/90 in past 12 months     BP Readings from Last 3 Encounters:   11/22/21 122/74   10/14/21 134/76   05/07/21 (!) 148/90                 Failed - Recent (12 mo) or future (30 days) visit within the authorizing provider's specialty     Patient has had an office visit with the authorizing provider or a provider within the authorizing providers department within the previous 12 mos or has a future within next 30 days. See \"Patient Info\" tab in inbasket, or \"Choose Columns\" in Meds & Orders section of the refill encounter.              Passed - Patient does not have Tadalafil, Vardenafil, or Sildenafil on their medication list        Passed - Medication is active on med list        Passed - Patient is 18 years of age or older                Radha Harvey RN on 7/25/2023 at 11:39 AM    "

## 2023-07-25 NOTE — LETTER
July 25, 2023      Matthew Lawler  [address]   [place] [address]            Dear Matthew.    We are unable to refill your tamsulosin because you have not followed up and establish care with a provider.     Please give us a call or use [employer] to set up an appt with a new pcp of your choosing.     Your [facility] Team

## 2023-07-26 ENCOUNTER — VIRTUAL VISIT (OUTPATIENT)
Dept: FAMILY MEDICINE | Facility: CLINIC | Age: 53
End: 2023-07-26
Payer: COMMERCIAL

## 2023-07-26 DIAGNOSIS — Z12.11 SCREEN FOR COLON CANCER: ICD-10-CM

## 2023-07-26 DIAGNOSIS — N40.1 BENIGN PROSTATIC HYPERPLASIA (BPH) WITH STRAINING ON URINATION: ICD-10-CM

## 2023-07-26 DIAGNOSIS — Z59.9 FINANCIAL DIFFICULTY: ICD-10-CM

## 2023-07-26 DIAGNOSIS — N52.9 ERECTILE DYSFUNCTION, UNSPECIFIED ERECTILE DYSFUNCTION TYPE: ICD-10-CM

## 2023-07-26 DIAGNOSIS — Z13.220 LIPID SCREENING: ICD-10-CM

## 2023-07-26 DIAGNOSIS — F41.9 ANXIETY: Primary | ICD-10-CM

## 2023-07-26 DIAGNOSIS — R39.16 BENIGN PROSTATIC HYPERPLASIA (BPH) WITH STRAINING ON URINATION: ICD-10-CM

## 2023-07-26 DIAGNOSIS — Z59.00 HOMELESS: ICD-10-CM

## 2023-07-26 DIAGNOSIS — Z13.1 SCREENING FOR DIABETES MELLITUS: ICD-10-CM

## 2023-07-26 PROBLEM — F11.93 HEROIN WITHDRAWAL (H): Status: RESOLVED | Noted: 2021-11-23 | Resolved: 2023-07-26

## 2023-07-26 PROCEDURE — 99214 OFFICE O/P EST MOD 30 MIN: CPT | Mod: VID | Performed by: FAMILY MEDICINE

## 2023-07-26 RX ORDER — SILDENAFIL 50 MG/1
50 TABLET, FILM COATED ORAL DAILY PRN
Qty: 90 TABLET | Refills: 0 | Status: SHIPPED | OUTPATIENT
Start: 2023-07-26 | End: 2023-11-30

## 2023-07-26 RX ORDER — TAMSULOSIN HYDROCHLORIDE 0.4 MG/1
0.4 CAPSULE ORAL DAILY
Qty: 90 CAPSULE | Refills: 3 | Status: SHIPPED | OUTPATIENT
Start: 2023-07-26 | End: 2023-11-30

## 2023-07-26 RX ORDER — ALPRAZOLAM 2 MG
TABLET ORAL
Qty: 30 TABLET | Refills: 0 | Status: SHIPPED | OUTPATIENT
Start: 2023-07-26 | End: 2023-11-30

## 2023-07-26 RX ORDER — SILDENAFIL 50 MG/1
50 TABLET, FILM COATED ORAL DAILY
COMMUNITY
Start: 2023-07-17 | End: 2023-07-26

## 2023-07-26 SDOH — ECONOMIC STABILITY - INCOME SECURITY: PROBLEM RELATED TO HOUSING AND ECONOMIC CIRCUMSTANCES, UNSPECIFIED: Z59.9

## 2023-07-26 SDOH — ECONOMIC STABILITY - HOUSING INSECURITY: HOMELESSNESS UNSPECIFIED: Z59.00

## 2023-07-26 ASSESSMENT — ANXIETY QUESTIONNAIRES
2. NOT BEING ABLE TO STOP OR CONTROL WORRYING: SEVERAL DAYS
4. TROUBLE RELAXING: SEVERAL DAYS
5. BEING SO RESTLESS THAT IT IS HARD TO SIT STILL: SEVERAL DAYS
1. FEELING NERVOUS, ANXIOUS, OR ON EDGE: NEARLY EVERY DAY
GAD7 TOTAL SCORE: 8
3. WORRYING TOO MUCH ABOUT DIFFERENT THINGS: SEVERAL DAYS
GAD7 TOTAL SCORE: 8
7. FEELING AFRAID AS IF SOMETHING AWFUL MIGHT HAPPEN: NOT AT ALL
IF YOU CHECKED OFF ANY PROBLEMS ON THIS QUESTIONNAIRE, HOW DIFFICULT HAVE THESE PROBLEMS MADE IT FOR YOU TO DO YOUR WORK, TAKE CARE OF THINGS AT HOME, OR GET ALONG WITH OTHER PEOPLE: SOMEWHAT DIFFICULT
6. BECOMING EASILY ANNOYED OR IRRITABLE: SEVERAL DAYS

## 2023-07-26 NOTE — PROGRESS NOTES
Matthew is a 52 year old who is being evaluated via a billable video visit.      How would you like to obtain your AVS? MyChart  If the video visit is dropped, the invitation should be resent by: Text to cell phone: [phone]  Will anyone else be joining your video visit? No    Assessment & Plan   1. Anxiety: Acutely worsened given social stressors, homelessness etc. Not on cymbalta. Will offer one time fill of xanax. PDMP reviewed. If needing more than this, needs follow up visit and start controller medication. Discussed addition/abuse potential. UDS with labs.  - ALPRAZolam (XANAX) 2 MG tablet; Take 1/2 tablet 2 times a day as needed for anxiety. Should not need daily. Needs visit for future refills. Should not drive on this medication.  Dispense: 30 tablet; Refill: 0  - UDS    2. Benign prostatic hyperplasia (BPH) with straining on urination: Worsening symptoms off of flomax. Refilled. Discussed not taking within 4 hours of viagra. Wishes to speak with urologist while he has insurance. Referral given.  - tamsulosin (FLOMAX) 0.4 MG capsule; Take 1 capsule (0.4 mg) by mouth daily Do not take within 4 hours of sildenafil (viagra)  Dispense: 90 capsule; Refill: 3  - sildenafil (VIAGRA) 50 MG tablet; Take 1 tablet (50 mg) by mouth daily as needed (ED) Do not take within 4 hours of your Flomax medication  Dispense: 90 tablet; Refill: 0  - Adult Urology  Referral; Future    3. Erectile dysfunction, unspecified erectile dysfunction type: Started by [facility]. Trial medication below.  No current cardiac symptoms, or other contraindication to trial of medication. Discussed proper use (30 min - 1 hour prior to sex), still needing physical/visual stimulation for medication to work, duration of action, and common side effects. Discussed using medication by himself first to determine if he will have side effects and effective dose. Discussed GoodRx coupons for better pricing.  Discussed not mixing with  nitroglycerin or alcohol.  Discussed being seen in the ER for erections lasting longer than 4 hours.  Patient agreeable with this plan.  He will message me via [employer] and let me know what dose is effective for him.  - sildenafil (VIAGRA) 50 MG tablet; Take 1 tablet (50 mg) by mouth daily as needed (ED) Do not take within 4 hours of your Flomax medication  Dispense: 90 tablet; Refill: 0    4. Screen for colon cancer  - Colonoscopy Screening  Referral; Future    5. Lipid screening  - Lipid panel reflex to direct LDL Non-fasting; Future    6. Screening for diabetes mellitus  - Basic metabolic panel  (Ca, Cl, CO2, Creat, Gluc, K, Na, BUN); Future    7. Homeless  8. Financial difficulty: As above.    Amanuel Connor MD  [facility]    Disclaimer: This note consists of symbols derived from keyboarding, dictation and/or voice recognition software. As a result, there may be errors in the script that have gone undetected. Please consider this when interpreting information found in this chart.    Leah Castro is a 52 year old, presenting for the following health issues:   Follow Up, Refill Request (Alprazolam and Flomax), and Urinary Problem        7/26/2023    12:46 PM   Additional Questions   Roomed by Huebrt ABDI   Accompanied by Self         7/26/2023    12:46 PM   Patient Reported Additional Medications   Patient reports taking the following new medications None       History of Present Illness       Mental Health Follow-up:  Patient presents to follow-up on Anxiety.    Patient's anxiety since last visit has been:  Bad  The patient is not having other symptoms associated with anxiety.  Any significant life events: financial concerns, housing concerns and health concerns  Patient is feeling anxious or having panic attacks.  Patient has no concerns about alcohol or drug use.    Reason for visit:  Need refil of meds , trouble urinating, and anxiety. Can i plz get a refill  of flomax and alprazolam    He eats 0-1 servings of fruits and vegetables daily.He consumes 2 sweetened beverage(s) daily.He exercises with enough effort to increase his heart rate 9 or less minutes per day.  He is missing 2 dose(s) of medications per week.  He is not taking prescribed medications regularly due to remembering to take.     Genitourinary - Male  Onset/Duration: Trouble urinating. Been going on for years. Takes Flomax for symptoms.Dr. Whitlock recommended a colonoscopy but has not completed that yet. Look to get a referral for a colonoscopy  Description:   Dysuria (painful urination): YES, without Flomax he only trickles urine  Hematuria (blood in urine): No  Frequency: No  Waking at night to urinate: YES- 3 times last night  Hesitancy (delay in urine): YES  Retention (unable to empty): YES  Decrease in urinary flow: YES- Without the Flomax  Incontinence: No  Progression of Symptoms:  Depends if he has the medication on time. Has not had the medication in about 5 to 6 days now  Accompanying Signs & Symptoms:  Fever: No  Back/Flank pain: No  Urethral discharge: No  Testicle lumps/masses/pain: No  Nausea and/or vomiting: No  Abdominal pain: No  History:   History of frequent UTI s: No  History of kidney stones: No  History of hernias: No  Personal or Family history of Prostate problems: Not certain, Paternal Father may have had some prostate issues. Father passed from bladder   Sexually active: YES  Precipitating or alleviating factors: Not taking medication on time that brings symptoms back  Therapies tried and outcome: Flomax    Out of flomax for ~3 weeks. Significant worsening of LUTS symptoms. Very difficult to start a stream and sometimes needs to push on suprapubic area.    Requesting xanax refill due to acute stressors. Was on this and Cymbalta in the past. Not currently using either. Homeless living in shelter. Will be starting job upcoming and worried he will no longer qualify for Relative[employer]  insurance. Would like routing physical/screening labs and colonoscopy ordered while insured.      Review of Systems   Constitutional, HEENT, cardiovascular, pulmonary, GI, , musculoskeletal, neuro, skin, endocrine and psych systems are negative, except as otherwise noted.      Objective       Vitals:  No vitals were obtained today due to virtual visit.    Physical Exam   GENERAL: Healthy, alert and no distress  EYES: Eyes grossly normal to inspection.  No discharge or erythema, or obvious scleral/conjunctival abnormalities.  RESP: No audible wheeze, cough, or visible cyanosis.  No visible retractions or increased work of breathing.    SKIN: Visible skin clear. No significant rash, abnormal pigmentation or lesions.  NEURO: Cranial nerves grossly intact.  Mentation and speech appropriate for age.  PSYCH: Mentation appears normal, affect normal/bright, judgement and insight intact, normal speech and appearance well-groomed.    Labs: pending        Video-Visit Details    Type of service:  Video Visit   Video Start Time:  5:22 PM  Video End Time: 5:35 PM    Originating Location (pt. Location): Home    Distant Location (provider location):  On-site  Platform used for Video Visit: Leann

## 2023-08-28 ENCOUNTER — MYC MEDICAL ADVICE (OUTPATIENT)
Dept: FAMILY MEDICINE | Facility: CLINIC | Age: 53
End: 2023-08-28
Payer: COMMERCIAL

## 2023-08-28 DIAGNOSIS — F41.9 ANXIETY: ICD-10-CM

## 2023-08-28 RX ORDER — ALPRAZOLAM 2 MG
TABLET ORAL
Qty: 30 TABLET | Refills: 0 | OUTPATIENT
Start: 2023-08-28

## 2023-08-28 NOTE — TELEPHONE ENCOUNTER
Patient returned call. Advised him that due to the medication type he needs to schedule a visit. Patient will call back once he figures out a ride.   Timothy Jane, MSN, RN, PHN  [facility]  August 28, 2023

## 2023-09-26 ENCOUNTER — TELEPHONE (OUTPATIENT)
Dept: GASTROENTEROLOGY | Facility: CLINIC | Age: 53
End: 2023-09-26
Payer: COMMERCIAL

## 2023-11-29 ASSESSMENT — ANXIETY QUESTIONNAIRES
5. BEING SO RESTLESS THAT IT IS HARD TO SIT STILL: NOT AT ALL
IF YOU CHECKED OFF ANY PROBLEMS ON THIS QUESTIONNAIRE, HOW DIFFICULT HAVE THESE PROBLEMS MADE IT FOR YOU TO DO YOUR WORK, TAKE CARE OF THINGS AT HOME, OR GET ALONG WITH OTHER PEOPLE: SOMEWHAT DIFFICULT
7. FEELING AFRAID AS IF SOMETHING AWFUL MIGHT HAPPEN: NOT AT ALL
4. TROUBLE RELAXING: SEVERAL DAYS
GAD7 TOTAL SCORE: 7
1. FEELING NERVOUS, ANXIOUS, OR ON EDGE: NEARLY EVERY DAY
GAD7 TOTAL SCORE: 7
6. BECOMING EASILY ANNOYED OR IRRITABLE: SEVERAL DAYS
3. WORRYING TOO MUCH ABOUT DIFFERENT THINGS: SEVERAL DAYS
2. NOT BEING ABLE TO STOP OR CONTROL WORRYING: SEVERAL DAYS

## 2023-11-29 NOTE — COMMUNITY RESOURCES LIST (ENGLISH)
11/29/2023   [facility]  N/A  For questions about this resource list or additional care needs, please contact your primary care clinic or care manager.  Phone: [phone]   Email: N/A   Address: [address] [facility] [place] [address]   Hours: N/A        Hotlines and Helplines       Hotline - Housing crisis  1  [facility] Distance: 7.3 miles      Phone/Virtual   [address] [place] [address]  Language: English  Hours: Mon - Sun Open 24 Hours  Fees: Free   Phone: (968) 252-1643 Email: info@saintolaf.org Website: http://www.saintolaf.org/     2  [facility] Distance: 8.12 miles      Phone/Virtual   [address] [place] [address]  Language: English  Hours: Mon - Sun Open 24 Hours   Phone: (205) 140-1999 Email: info@[employer] Website: http://www.Defixo.org          Housing       Coordinated Entry access point  3  [facility]  [facility] Distance: 5.51 miles      Phone/Virtual   [address] [place] [address]  Language: English  Hours: Mon - Fri 8:30 AM - 12:00 PM , Mon - Fri 1:00 PM - 4:00 PM  Fees: Free   Phone: (832) 440-5131 Ext.2 Email: kvng@[facility].[employer].org Website: https://www.Genetic Financeusa.org/usn/     4  [facility] ([facility]) Distance: 6.76 miles      In-Person, Phone/Virtual   [address] [place] [address]  Language: English, Indonesian  Hours: Mon - Fri 10:00 AM - 5:30 PM , Mon - Sun 7:30 PM - 10:20 PM , Sat - Sun 1:00 PM - 5:30 PM  Fees: Free   Phone: (948) 119-5309 Email: info@[employer] Social Campaign Website: https://www.[employer].org/our-programs/adult-shelter-connect-cash-shelter/     Drop-in center or day shelter  5  [facility] Distance: 6.54 miles      In-Person   [address] [place] [address]  Language: English, Hmong, St Lucian, Indonesian  Hours: Mon - u 8:30 AM - 4:30 PM , Sat - Sun 9:00 AM - 12:00 PM  Fees:  Free   Phone: (291) 156-9824 Email: info@[employer].Unda Website: https://[facility] [employer].org/     6  [facility] Distance: 7.93 miles      In-Person   [address] [place] [address]  Language: English, South Sudanese, Niuean  Hours: Mon - Sat 7:00 AM - 3:00 PM  Fees: Free, Self Pay   Phone: (460) 766-1485 Email: info@[employer].Unda Website: https://www.[employer].org/locations/opportunity-[place]/     Housing search assistance  7  [facility] ([employer]) Distance: 0.71 miles      Phone/Virtual   [address] [place] [address]  Language: English, Niuean  Hours: Mon - Fri 9:00 AM - 5:00 PM  Fees: Free   Phone: (349) 164-2927 Email: services@[employer] Website: https://www.Intrinsity     8  [facility] ([facility] Distance: 3.64 miles      In-Person   [address] [address]  Language: English  Hours: Mon - Fri 8:00 AM - 4:00 PM  Fees: Free   Phone: (832) 571-8512 Email: info@[facility].Unda Website: https://Drillster[employer].org/     Shelter for families  9  [facility] Distance: 16.8 miles      In-Person   [address] [place] [address]  Language: English  Hours: Mon - Fri 3:00 PM - 9:00 AM , Sat - Sun Open 24 Hours  Fees: Free   Phone: (151) 351-6275 Ext.1 Website: https://www.saintandrews.org/2020/07/03/emergency-family-shelter/     Shelter for individuals  10  [facility] Distance: 6.81 miles      In-Person   [address] [place] [address]  Language: English  Hours: Mon - Sun 5:00 PM - 10:00 AM  Fees: Free, Self Pay   Phone: (279) 153-4772 Email: info@[employer].org Website: https://www.[employer].org/locations/higher-ground-shelter/     11  [facility] Distance: 6.91 miles      In-Person    [address] [place] [address]  Language: English  Hours: Mon - Fri 4:00 PM - 9:00 AM  Fees: Free   Phone: (156) 840-5636 Email: cody@[facility].[employer].[facility] Website: https://centralDr. [facility].org/[facility]/          Transportation       Free or low-cost transportation  12  [facility] - Free or low-cost transportation Distance: 7.18 miles      In-Person   [address] [place] [address]  Language: English  Hours: Tue 9:30 AM - 11:30 AM , Thu 9:30 AM - 11:30 AM  Fees: Free   Phone: (866) 169-2642 Email: info@[employer] Website: http://www.Nosto/     13  [facility] Distance: 7.3 miles      In-Person   [address] [place] [address]  Language: English  Hours: Mon - Wed 9:30 AM - 12:00 PM , Mon - Wed 1:00 PM - 2:00 PM Appt. Only  Fees: Free   Phone: (918) 691-2236 Email: info@saintolaf.org Website: http://www.saintolaf.org/     Transportation to medical appointments  14  [facility]   Family Wellness (AIFW) Distance: 0.64 miles      In-Person   [address] [place] [address]  Language: Kyrgyz, German, English, Gujarati, Ruben, Welsh, Latvian, Amharic, Tajik, Malay  Hours: Mon - Wed 9:00 AM - 5:00 PM , Thu 12:00 PM - 6:00 PM , Fri 9:00 AM - 5:00 PM , Sun 10:30 AM - 2:00 PM Appt. Only  Fees: Free   Phone: (759) 510-8192 Email: info@sew-aifw.org Website: https://www.sewa-aifw.org/     15  [employer] Distance: 4.07 miles      In-Person   [address] [facility] [place] [address]  Language: English  Hours: Mon - Sat 4:00 AM - 6:00 PM  Fees: Insurance, Self Pay   Phone: (293) 388-5546 Email: niration1@[employer] Support Website: https://helpmecmalikect.[facility]./HelpJoseect/Providers/Delight_Transportation/Transportation/2?returnUrl=%2FHelpMeConnect%2FSearch%2FBasicNeeds%2FTransportation%2FTransportationServices%3Fstart%3D40          Important Numbers & Websites        Emergency Services   911  [facility] Services   [facility]  Poison Control   (688) 854-6871  Suicide Prevention Lifeline   (378) 486-8008 (TALK)  Child Abuse Hotline   (743) 946-5230 (4-A-Child)  Sexual Assault Hotline   (390) 747-6415 (HOPE)  National Runaway Safeline   (463) 553-4483 (RUNAWAY)  All-Options Talkline   (105) 697-1019  Substance Abuse Referral   (889) 279-3203 (HELP)

## 2023-11-30 ENCOUNTER — VIRTUAL VISIT (OUTPATIENT)
Dept: FAMILY MEDICINE | Facility: CLINIC | Age: 53
End: 2023-11-30
Payer: COMMERCIAL

## 2023-11-30 DIAGNOSIS — N40.1 BENIGN PROSTATIC HYPERPLASIA (BPH) WITH STRAINING ON URINATION: Primary | ICD-10-CM

## 2023-11-30 DIAGNOSIS — N52.9 ERECTILE DYSFUNCTION, UNSPECIFIED ERECTILE DYSFUNCTION TYPE: ICD-10-CM

## 2023-11-30 DIAGNOSIS — R39.16 BENIGN PROSTATIC HYPERPLASIA (BPH) WITH STRAINING ON URINATION: Primary | ICD-10-CM

## 2023-11-30 DIAGNOSIS — F41.9 ANXIETY: ICD-10-CM

## 2023-11-30 PROCEDURE — 99214 OFFICE O/P EST MOD 30 MIN: CPT | Mod: VID | Performed by: FAMILY MEDICINE

## 2023-11-30 RX ORDER — ALPRAZOLAM 2 MG
TABLET ORAL
Qty: 30 TABLET | Refills: 0 | Status: SHIPPED | OUTPATIENT
Start: 2023-11-30 | End: 2024-02-21

## 2023-11-30 RX ORDER — SILDENAFIL 50 MG/1
50 TABLET, FILM COATED ORAL DAILY PRN
Qty: 90 TABLET | Refills: 0 | Status: SHIPPED | OUTPATIENT
Start: 2023-11-30 | End: 2024-02-21

## 2023-11-30 RX ORDER — TAMSULOSIN HYDROCHLORIDE 0.4 MG/1
0.8 CAPSULE ORAL DAILY
Qty: 180 CAPSULE | Refills: 3 | Status: SHIPPED | OUTPATIENT
Start: 2023-11-30

## 2023-11-30 NOTE — PROGRESS NOTES
"Instructions Relayed to Patient by Virtual Roomer:     Patient is active on [employer]:   Relayed following to patient: \"It looks like you are active on [employer], are you able to join the visit this way? If not, do you need us to send you a link now or would you like your provider to send a link via text or email when they are ready to initiate the visit?\"    Reminded patient to ensure they were logged on to virtual visit by arrival time listed. Documented in appointment notes if patient had flexibility to initiate visit sooner than arrival time. If pediatric virtual visit, ensured pediatric patient along with parent/guardian will be present for video visit.     Patient offered the website [email or web address].All Together [email or web address]/video-visits and/or phone number to [employer] Help line: [phone]     Matthew is a 53 year old who is being evaluated via a billable video visit.      How would you like to obtain your AVS? Rubys[employer]ic  If the video visit is dropped, the invitation should be resent by: Text to cell phone: [phone]  Will anyone else be joining your video visit? No    Assessment & Plan   1. Benign prostatic hyperplasia (BPH) with straining on urination  Increase to 0.8 mg. Consider finasteride and/or switching viagra to cialis. Urology referral if not improving with increased flomax.  - Adult Urology  Referral; Future  - tamsulosin (FLOMAX) 0.4 MG capsule; Take 2 capsules (0.8 mg) by mouth daily Do not take within 4 hours of sildenafil (viagra)  Dispense: 180 capsule; Refill: 3    2. Erectile dysfunction, unspecified erectile dysfunction type  Continue medication below.  No current cardiac symptoms, or other contraindication to trial of medication. Discussed proper use (30 min - 1 hour prior to sex), still needing physical/visual stimulation for medication to work, duration of action, and common side effects. Discussed GoodRx coupons for better pricing.  Discussed not mixing with nitroglycerin or alcohol.  Discussed " being seen in the ER for erections lasting longer than 4 hours.  Patient agreeable with this plan.    - sildenafil (VIAGRA) 50 MG tablet; Take 1 tablet (50 mg) by mouth daily as needed (ED) Do not take within 4 hours of your Flomax medication  Dispense: 90 tablet; Refill: 0    3. Anxiety  PDMP reviewed. Encourage controller. Has not filled in 5 months. Continue sparing xanax use.  - ALPRAZolam (XANAX) 2 MG tablet; Take 1/2 tablet 2 times a day as needed for anxiety. Should not need daily. Needs visit for future refills. Should not drive on this medication.  Dispense: 30 tablet; Refill: 0        Amanuel Connor MD  [facility]    Disclaimer: This note consists of symbols derived from keyboarding, dictation and/or voice recognition software. As a result, there may be errors in the script that have gone undetected. Please consider this when interpreting information found in this chart.;alissa Castro is a 53 year old, presenting for the following health issues:  Recheck Medication    History of Present Illness       Mental Health Follow-up:  Patient presents to follow-up on Anxiety.    Patient's anxiety since last visit has been:  No change  The patient is having other symptoms associated with anxiety.  Any significant life events: job concerns, financial concerns, housing concerns and health concerns  Patient is feeling anxious or having panic attacks.  Patient has no concerns about alcohol or drug use.    Reason for visit:  Flowmax ,sildenafil and alprazolam refills please.    He eats 0-1 servings of fruits and vegetables daily.He consumes 4 sweetened beverage(s) daily.He exercises with enough effort to increase his heart rate 9 or less minutes per day.  He exercises with enough effort to increase his heart rate 3 or less days per week. He is missing 2 dose(s) of medications per week.  He is not taking prescribed medications regularly due to remembering to take.      Medication Followup of All Current Medications  Taking Medication as prescribed: yes  Side Effects:  None  Medication Helping Symptoms:  yes    Flowmax helpful, but still having symptoms. Wondering about alternative.    Viagra working well no side effects. Taking 4 hours apart from flomax.    Still homeless working on housing. Anxiety still high. Requesting refill of xanax for bad nights. Has not filled since July.    No other questions or concerns.    Review of Systems   Constitutional, HEENT, cardiovascular, pulmonary, GI, , musculoskeletal, neuro, skin, endocrine and psych systems are negative, except as otherwise noted.      Objective           Vitals:  No vitals were obtained today due to virtual visit.    Physical Exam   GENERAL: Healthy, alert and no distress  EYES: Eyes grossly normal to inspection.  No discharge or erythema, or obvious scleral/conjunctival abnormalities.  RESP: No audible wheeze, cough, or visible cyanosis.  No visible retractions or increased work of breathing.    SKIN: Visible skin clear. No significant rash, abnormal pigmentation or lesions.  NEURO: Cranial nerves grossly intact.  Mentation and speech appropriate for age.  PSYCH: Mentation appears normal, affect normal/bright, judgement and insight intact, normal speech and appearance well-groomed.    Labs: none        Video-Visit Details    Type of service:  Video Visit   Video Start Time: 5:30 PM  Video End Time:5:40 PM    Originating Location (pt. Location): Home    Distant Location (provider location):  On-site  Platform used for Video Visit: [employer]

## 2024-01-10 NOTE — PATIENT INSTRUCTIONS
Clinic Care Coordination Contact  Follow Up Progress Note      Assessment: The pt was recently in the ED, I called to check up on the pt and help the pt setup a ED follow up. I called and talked to the pt, pt stated that she is doing better. She did want to make a follow up, so I was able to setup for the pt to see  on 01/15/2024 at 8:40am.    Care Gaps:    Health Maintenance Due   Topic Date Due    ADVANCE CARE PLANNING  Never done    HEPATITIS B IMMUNIZATION (1 of 3 - 3-dose series) Never done    Pneumococcal Vaccine: Pediatrics (0 to 5 Years) and At-Risk Patients (6 to 64 Years) (1 of 2 - PCV) Never done    YEARLY PREVENTIVE VISIT  03/01/2002    DTAP/TDAP/TD IMMUNIZATION (3 - Td or Tdap) 09/11/2021    NICOTINE/TOBACCO CESSATION COUNSELING Q 1 YR  10/15/2022    INFLUENZA VACCINE (1) 09/01/2023    COVID-19 Vaccine (3 - 2023-24 season) 09/01/2023    PHQ-2 (once per calendar year)  01/01/2024           Care Plans      Intervention/Education provided during outreach:               Plan:     Care Coordinator will follow up in    Please call [phone] to schedule ultrasound around 5/28/18.

## 2024-02-18 ENCOUNTER — HEALTH MAINTENANCE LETTER (OUTPATIENT)
Age: 54
End: 2024-02-18

## 2024-02-21 ENCOUNTER — VIRTUAL VISIT (OUTPATIENT)
Dept: FAMILY MEDICINE | Facility: CLINIC | Age: 54
End: 2024-02-21
Payer: COMMERCIAL

## 2024-02-21 DIAGNOSIS — N52.9 ERECTILE DYSFUNCTION, UNSPECIFIED ERECTILE DYSFUNCTION TYPE: Primary | ICD-10-CM

## 2024-02-21 DIAGNOSIS — F41.9 ANXIETY: ICD-10-CM

## 2024-02-21 PROCEDURE — 99214 OFFICE O/P EST MOD 30 MIN: CPT | Mod: 95 | Performed by: FAMILY MEDICINE

## 2024-02-21 PROCEDURE — 96127 BRIEF EMOTIONAL/BEHAV ASSMT: CPT | Mod: 95 | Performed by: FAMILY MEDICINE

## 2024-02-21 RX ORDER — ALPRAZOLAM 2 MG
TABLET ORAL
Qty: 30 TABLET | Refills: 0 | Status: SHIPPED | OUTPATIENT
Start: 2024-02-21 | End: 2024-04-18

## 2024-02-21 RX ORDER — SILDENAFIL 50 MG/1
50 TABLET, FILM COATED ORAL DAILY PRN
Qty: 90 TABLET | Refills: 0 | Status: SHIPPED | OUTPATIENT
Start: 2024-02-21 | End: 2024-09-19

## 2024-02-21 ASSESSMENT — ANXIETY QUESTIONNAIRES
5. BEING SO RESTLESS THAT IT IS HARD TO SIT STILL: SEVERAL DAYS
7. FEELING AFRAID AS IF SOMETHING AWFUL MIGHT HAPPEN: NOT AT ALL
3. WORRYING TOO MUCH ABOUT DIFFERENT THINGS: NOT AT ALL
GAD7 TOTAL SCORE: 4
GAD7 TOTAL SCORE: 4
6. BECOMING EASILY ANNOYED OR IRRITABLE: SEVERAL DAYS
2. NOT BEING ABLE TO STOP OR CONTROL WORRYING: NOT AT ALL
1. FEELING NERVOUS, ANXIOUS, OR ON EDGE: SEVERAL DAYS
4. TROUBLE RELAXING: SEVERAL DAYS
GAD7 TOTAL SCORE: 4
7. FEELING AFRAID AS IF SOMETHING AWFUL MIGHT HAPPEN: NOT AT ALL
IF YOU CHECKED OFF ANY PROBLEMS ON THIS QUESTIONNAIRE, HOW DIFFICULT HAVE THESE PROBLEMS MADE IT FOR YOU TO DO YOUR WORK, TAKE CARE OF THINGS AT HOME, OR GET ALONG WITH OTHER PEOPLE: NOT DIFFICULT AT ALL
8. IF YOU CHECKED OFF ANY PROBLEMS, HOW DIFFICULT HAVE THESE MADE IT FOR YOU TO DO YOUR WORK, TAKE CARE OF THINGS AT HOME, OR GET ALONG WITH OTHER PEOPLE?: NOT DIFFICULT AT ALL

## 2024-02-21 ASSESSMENT — ENCOUNTER SYMPTOMS: NERVOUS/ANXIOUS: 1

## 2024-02-21 NOTE — PROGRESS NOTES
"Instructions Relayed to Patient by Virtual Roomer:     Patient is active on [employer]:   Relayed following to patient: \"It looks like you are active on [employer], are you able to join the visit this way? If not, do you need us to send you a link now or would you like your provider to send a link via text or email when they are ready to initiate the visit?\"    Reminded patient to ensure they were logged on to virtual visit by arrival time listed. Documented in appointment notes if patient had flexibility to initiate visit sooner than arrival time. If pediatric virtual visit, ensured pediatric patient along with parent/guardian will be present for video visit.     Patient offered the website [email or web address] and/or phone number to [employer] Help line: [phone]    Matthew is a 53 year old who is being evaluated via a billable video visit.      How would you like to obtain your AVS? [employer] Informatics  If the video visit is dropped, the invitation should be resent by: Text to cell phone: [phone]  Will anyone else be joining your video visit? No          Assessment & Plan   1. Erectile dysfunction, unspecified erectile dysfunction type  Continue medication below.  No current cardiac symptoms, or other contraindication to trial of medication. Discussed proper use (30 min - 1 hour prior to sex), still needing physical/visual stimulation for medication to work, duration of action, and common side effects. Discussed GoodRx coupons for better pricing.  Discussed not mixing with nitroglycerin or alcohol.  Discussed being seen in the ER for erections lasting longer than 4 hours.  Patient agreeable with this plan.    - sildenafil (VIAGRA) 50 MG tablet; Take 1 tablet (50 mg) by mouth daily as needed (ED) Do not take within 4 hours of your Flomax medication  Dispense: 90 tablet; Refill: 0     2. Anxiety  PDMP reviewed. Encourage controller. Has not filled in 5 months. Continue sparing xanax use.  - ALPRAZolam (XANAX) 2 " MG tablet; Take 1/2 tablet 2 times a day as needed for anxiety. Should not need daily. Needs visit for future refills. Should not drive on this medication.  Dispense: 30 tablet; Refill: 0      Amanuel Connor MD  [facility]    Disclaimer: This note consists of symbols derived from keyboarding, dictation and/or voice recognition software. As a result, there may be errors in the script that have gone undetected. Please consider this when interpreting information found in this chart.    Leah Castro is a 53 year old, presenting for the following health issues:  Depression and Anxiety      2/21/2024     3:11 PM   Additional Questions   Roomed by yulisa   Accompanied by self     Anxiety    History of Present Illness       Mental Health Follow-up:  Patient presents to follow-up on Anxiety.    Patient's anxiety since last visit has been:  Medium  The patient is having other symptoms associated with anxiety.  Any significant life events: job concerns, financial concerns and housing concerns  Patient is feeling anxious or having panic attacks.  Patient has no concerns about alcohol or drug use.    Reason for visit:  I took too long to  the Viagra, the pharmacy said it needed to be re ordered.  Also, I just took my last 1/4 of alprazolam, and would like to request a refill of that one as well.    He eats 0-1 servings of fruits and vegetables daily.He consumes 4 sweetened beverage(s) daily.He exercises with enough effort to increase his heart rate 9 or less minutes per day.  He exercises with enough effort to increase his heart rate 3 or less days per week. He is missing 2 dose(s) of medications per week.  He is not taking prescribed medications regularly due to remembering to take.     Viagra working well no side effects. Taking 4 hours apart from flomax.     Still homeless working on housing, staying at [facility]. Anxiety still high, but better somewhat. Requesting refill of  xanax for bad nights. Has not filled since November.     No other questions or concerns.        7/26/2023    12:10 PM 11/29/2023     1:57 PM 2/21/2024     8:29 AM   ISHMAEL-7 SCORE   Total Score 8 (mild anxiety) 7 (mild anxiety) 4 (minimal anxiety)   Total Score 8 7 4       Review of Systems  Constitutional, HEENT, cardiovascular, pulmonary, GI, , musculoskeletal, neuro, skin, endocrine and psych systems are negative, except as otherwise noted.      Objective       Vitals:  No vitals were obtained today due to virtual visit.    Physical Exam   GENERAL: alert and no distress  EYES: Eyes grossly normal to inspection.  No discharge or erythema, or obvious scleral/conjunctival abnormalities.  RESP: No audible wheeze, cough, or visible cyanosis.    SKIN: Visible skin clear. No significant rash, abnormal pigmentation or lesions.  NEURO: Cranial nerves grossly intact.  Mentation and speech appropriate for age.  PSYCH: Appropriate affect, tone, and pace of words    Labs: None      Video-Visit Details    Type of service:  Video Visit   Video Start Time: 5:03PM  Video End Time:5:09 PM    Originating Location (pt. Location): Home    Distant Location (provider location):  On-site  Platform used for Video Visit: Leann  Signed Electronically by: Amanuel Connor MD

## 2024-04-15 ASSESSMENT — ANXIETY QUESTIONNAIRES
8. IF YOU CHECKED OFF ANY PROBLEMS, HOW DIFFICULT HAVE THESE MADE IT FOR YOU TO DO YOUR WORK, TAKE CARE OF THINGS AT HOME, OR GET ALONG WITH OTHER PEOPLE?: NOT DIFFICULT AT ALL
3. WORRYING TOO MUCH ABOUT DIFFERENT THINGS: SEVERAL DAYS
GAD7 TOTAL SCORE: 4
7. FEELING AFRAID AS IF SOMETHING AWFUL MIGHT HAPPEN: NOT AT ALL
6. BECOMING EASILY ANNOYED OR IRRITABLE: NOT AT ALL
1. FEELING NERVOUS, ANXIOUS, OR ON EDGE: SEVERAL DAYS
7. FEELING AFRAID AS IF SOMETHING AWFUL MIGHT HAPPEN: NOT AT ALL
2. NOT BEING ABLE TO STOP OR CONTROL WORRYING: SEVERAL DAYS
GAD7 TOTAL SCORE: 4
4. TROUBLE RELAXING: SEVERAL DAYS
5. BEING SO RESTLESS THAT IT IS HARD TO SIT STILL: NOT AT ALL
IF YOU CHECKED OFF ANY PROBLEMS ON THIS QUESTIONNAIRE, HOW DIFFICULT HAVE THESE PROBLEMS MADE IT FOR YOU TO DO YOUR WORK, TAKE CARE OF THINGS AT HOME, OR GET ALONG WITH OTHER PEOPLE: NOT DIFFICULT AT ALL

## 2024-04-17 NOTE — TELEPHONE ENCOUNTER
Please schedule virtual visit with me this week.    Chip Whitlock MD, FAAFP  Family Medicine Physician  [facility] [place]  [address], [place] [address]       Detail Level: Zone Initiate Treatment: OTC zeasorb AF powder or triple paste ointment.

## 2024-04-18 ENCOUNTER — VIRTUAL VISIT (OUTPATIENT)
Dept: FAMILY MEDICINE | Facility: CLINIC | Age: 54
End: 2024-04-18

## 2024-04-18 DIAGNOSIS — F41.9 ANXIETY: Primary | ICD-10-CM

## 2024-04-18 DIAGNOSIS — K04.7 DENTAL ABSCESS: ICD-10-CM

## 2024-04-18 PROCEDURE — G2211 COMPLEX E/M VISIT ADD ON: HCPCS | Mod: 95 | Performed by: FAMILY MEDICINE

## 2024-04-18 PROCEDURE — 99214 OFFICE O/P EST MOD 30 MIN: CPT | Mod: 95 | Performed by: FAMILY MEDICINE

## 2024-04-18 RX ORDER — ALPRAZOLAM 2 MG
TABLET ORAL
Qty: 30 TABLET | Refills: 0 | Status: SHIPPED | OUTPATIENT
Start: 2024-04-18

## 2024-04-18 ASSESSMENT — ENCOUNTER SYMPTOMS: NERVOUS/ANXIOUS: 1

## 2024-04-18 NOTE — PROGRESS NOTES
"Instructions Relayed to Patient by Virtual Roomer:     Patient is active on [employer]:   Relayed following to patient: \"It looks like you are active on [employer], are you able to join the visit this way? If not, do you need us to send you a link now or would you like your provider to send a link via text or email when they are ready to initiate the visit?\"    Reminded patient to ensure they were logged on to virtual visit by arrival time listed. Documented in appointment notes if patient had flexibility to initiate visit sooner than arrival time. If pediatric virtual visit, ensured pediatric patient along with parent/guardian will be present for video visit.     Patient offered the website [email or web address] and/or phone number to [employer] Help line: [phone]    Matthew is a 53 year old who is being evaluated via a billable video visit.    How would you like to obtain your AVS? [employer]  If the video visit is dropped, the invitation should be resent by: Text to cell phone: [phone]  Will anyone else be joining your video visit? No    Assessment & Plan   1. Anxiety  Given acute stressor will offer early refill.  ER visits reviewed.  Upcoming oral surgeon appointment.  If this becomes a recurrence we will discuss either discontinuation of the medication and or need to be on a controller medication.  - ALPRAZolam (XANAX) 2 MG tablet; Take 1/2 tablet 2 times a day as needed for anxiety. Should not need daily. Needs visit for future refills. Should not drive on this medication.  Dispense: 30 tablet; Refill: 0    2. Dental abscess  Records reviewed.        Amanuel Connor MD  [facility]    Disclaimer: This note consists of symbols derived from keyboarding, dictation and/or voice recognition software. As a result, there may be errors in the script that have gone undetected. Please consider this when interpreting information found in this chart.    The longitudinal " plan of care for the diagnosis(es)/condition(s) as documented were addressed during this visit. Due to the added complexity in care, I will continue to support Matthew in the subsequent management and with ongoing continuity of care.    Subjective   Matthew is a 53 year old, presenting for the following health issues:  Anxiety      4/18/2024     3:27 PM   Additional Questions   Roomed by yulisa   Accompanied by self     Anxiety    History of Present Illness       Mental Health Follow-up:  Patient presents to follow-up on Anxiety.    Patient's anxiety since last visit has been:  Medium  The patient is having other symptoms associated with anxiety.  Any significant life events: health concerns and other  Patient is feeling anxious or having panic attacks.  Patient has no concerns about alcohol or drug use.    Reason for visit:  Med refills, flomax and alprazolam.... the alprazolam didn't last three mos this time, i had an abcess n went to [facility] got antibiotics  450 mg of clindamycin 3 x , 5 days later i had to go back for an incision.  My dentist sending me to oral surgeon,    He eats 0-1 servings of fruits and vegetables daily.He consumes 2 sweetened beverage(s) daily.He exercises with enough effort to increase his heart rate 9 or less minutes per day.  He exercises with enough effort to increase his heart rate 3 or less days per week. He is missing 2 dose(s) of medications per week.  He is not taking prescribed medications regularly due to remembering to take.         11/29/2023     1:57 PM 2/21/2024     8:29 AM 4/15/2024    10:07 PM   ISHMAEL-7 SCORE   Total Score 7 (mild anxiety) 4 (minimal anxiety) 4 (minimal anxiety)   Total Score 7 4 4       Patient is here to discuss his anxiety.  He did use up his Xanax after 2 months rather than 3 months as previously discussed.  This is in light of a recent tooth abscess requiring 2 ER visits and now an oral surgeon.  His medications are otherwise doing fine.  He has not taken the  previously prescribed pain medication from the ER on the PDMP.  No other questions or concerns.    Review of Systems  Constitutional, HEENT, cardiovascular, pulmonary, GI, , musculoskeletal, neuro, skin, endocrine and psych systems are negative, except as otherwise noted.      Objective           Vitals:  No vitals were obtained today due to virtual visit.    Physical Exam   GENERAL: alert and no distress  EYES: Eyes grossly normal to inspection.  No discharge or erythema, or obvious scleral/conjunctival abnormalities.  RESP: No audible wheeze, cough, or visible cyanosis.    SKIN: Visible skin clear. No significant rash, abnormal pigmentation or lesions.  NEURO: Cranial nerves grossly intact.  Mentation and speech appropriate for age.  PSYCH: Appropriate affect, tone, and pace of words    Labs: None      Video-Visit Details    Type of service:  Video Visit   Originating Location (pt. Location): Home    Distant Location (provider location):  On-site  Platform used for Video Visit: Leann  Signed Electronically by: Amanuel Connor MD

## 2024-09-19 ENCOUNTER — MYC REFILL (OUTPATIENT)
Dept: FAMILY MEDICINE | Facility: CLINIC | Age: 54
End: 2024-09-19

## 2024-09-19 DIAGNOSIS — N52.9 ERECTILE DYSFUNCTION, UNSPECIFIED ERECTILE DYSFUNCTION TYPE: ICD-10-CM

## 2024-09-19 RX ORDER — SILDENAFIL 50 MG/1
50 TABLET, FILM COATED ORAL DAILY PRN
Qty: 90 TABLET | Refills: 1 | Status: SHIPPED | OUTPATIENT
Start: 2024-09-19

## 2025-02-09 DIAGNOSIS — N40.1 BENIGN PROSTATIC HYPERPLASIA (BPH) WITH STRAINING ON URINATION: ICD-10-CM

## 2025-02-09 DIAGNOSIS — R39.16 BENIGN PROSTATIC HYPERPLASIA (BPH) WITH STRAINING ON URINATION: ICD-10-CM

## 2025-02-10 RX ORDER — TAMSULOSIN HYDROCHLORIDE 0.4 MG/1
CAPSULE ORAL
Qty: 180 CAPSULE | Refills: 0 | Status: SHIPPED | OUTPATIENT
Start: 2025-02-10

## 2025-02-10 NOTE — TELEPHONE ENCOUNTER
A 1x yolanda refill has been given. Patient is due for a follow-up appointment.    Please contact patient and assist in scheduling appointment.     Inform patient future refills will be declined without completing appointment for monitoring control, or making mutually agreed upon follow-up arrangements .       Thank you,    Dr. Read

## 2025-03-09 ENCOUNTER — HEALTH MAINTENANCE LETTER (OUTPATIENT)
Age: 55
End: 2025-03-09

## 2025-03-25 ENCOUNTER — MYC REFILL (OUTPATIENT)
Dept: FAMILY MEDICINE | Facility: CLINIC | Age: 55
End: 2025-03-25

## 2025-03-25 ENCOUNTER — E-VISIT (OUTPATIENT)
Dept: FAMILY MEDICINE | Facility: CLINIC | Age: 55
End: 2025-03-25
Payer: COMMERCIAL

## 2025-03-25 DIAGNOSIS — F43.21 GRIEF REACTION: Primary | ICD-10-CM

## 2025-03-25 DIAGNOSIS — F41.9 ANXIETY: ICD-10-CM

## 2025-03-25 DIAGNOSIS — F43.21 GRIEF REACTION: ICD-10-CM

## 2025-03-25 PROCEDURE — 99207 PR NON-BILLABLE SERV PER CHARTING: CPT | Performed by: FAMILY MEDICINE

## 2025-03-25 RX ORDER — ALPRAZOLAM 2 MG/1
TABLET ORAL
Qty: 30 TABLET | Refills: 0 | Status: CANCELLED | OUTPATIENT
Start: 2025-03-25

## 2025-03-26 ENCOUNTER — VIRTUAL VISIT (OUTPATIENT)
Dept: FAMILY MEDICINE | Facility: CLINIC | Age: 55
End: 2025-03-26
Payer: COMMERCIAL

## 2025-03-26 DIAGNOSIS — J30.1 ALLERGIC RHINITIS DUE TO POLLEN, UNSPECIFIED SEASONALITY: ICD-10-CM

## 2025-03-26 DIAGNOSIS — R05.3 CHRONIC COUGH: ICD-10-CM

## 2025-03-26 DIAGNOSIS — F43.21 GRIEF REACTION: ICD-10-CM

## 2025-03-26 DIAGNOSIS — F41.9 ANXIETY: Primary | ICD-10-CM

## 2025-03-26 DIAGNOSIS — Z12.11 SCREEN FOR COLON CANCER: ICD-10-CM

## 2025-03-26 PROCEDURE — 98005 SYNCH AUDIO-VIDEO EST LOW 20: CPT

## 2025-03-26 RX ORDER — FLUTICASONE PROPIONATE 50 MCG
1 SPRAY, SUSPENSION (ML) NASAL 2 TIMES DAILY
Qty: 16 G | Refills: 3 | Status: SHIPPED | OUTPATIENT
Start: 2025-03-26

## 2025-03-26 RX ORDER — FLUTICASONE PROPIONATE 50 MCG
1 SPRAY, SUSPENSION (ML) NASAL 2 TIMES DAILY
Qty: 16 G | Refills: 3 | Status: CANCELLED | OUTPATIENT
Start: 2025-03-26

## 2025-03-26 RX ORDER — ALPRAZOLAM 2 MG/1
TABLET ORAL
Qty: 30 TABLET | Refills: 0 | Status: SHIPPED | OUTPATIENT
Start: 2025-03-26

## 2025-03-26 RX ORDER — ALBUTEROL SULFATE 90 UG/1
2 INHALANT RESPIRATORY (INHALATION) EVERY 6 HOURS PRN
Qty: 18 G | Refills: 0 | Status: SHIPPED | OUTPATIENT
Start: 2025-03-26

## 2025-03-26 ASSESSMENT — ANXIETY QUESTIONNAIRES
GAD7 TOTAL SCORE: 3
6. BECOMING EASILY ANNOYED OR IRRITABLE: NOT AT ALL
4. TROUBLE RELAXING: SEVERAL DAYS
1. FEELING NERVOUS, ANXIOUS, OR ON EDGE: SEVERAL DAYS
IF YOU CHECKED OFF ANY PROBLEMS ON THIS QUESTIONNAIRE, HOW DIFFICULT HAVE THESE PROBLEMS MADE IT FOR YOU TO DO YOUR WORK, TAKE CARE OF THINGS AT HOME, OR GET ALONG WITH OTHER PEOPLE: NOT DIFFICULT AT ALL
2. NOT BEING ABLE TO STOP OR CONTROL WORRYING: NOT AT ALL
5. BEING SO RESTLESS THAT IT IS HARD TO SIT STILL: SEVERAL DAYS
GAD7 TOTAL SCORE: 3
7. FEELING AFRAID AS IF SOMETHING AWFUL MIGHT HAPPEN: NOT AT ALL
3. WORRYING TOO MUCH ABOUT DIFFERENT THINGS: NOT AT ALL
8. IF YOU CHECKED OFF ANY PROBLEMS, HOW DIFFICULT HAVE THESE MADE IT FOR YOU TO DO YOUR WORK, TAKE CARE OF THINGS AT HOME, OR GET ALONG WITH OTHER PEOPLE?: NOT DIFFICULT AT ALL

## 2025-03-26 ASSESSMENT — ENCOUNTER SYMPTOMS: NERVOUS/ANXIOUS: 1

## 2025-03-26 NOTE — PROGRESS NOTES
Matthew is a 54 year old who is being evaluated via a billable video visit.    How would you like to obtain your AVS? MyChart  If the video visit is dropped, the invitation should be resent by: Text to cell phone: [phone]  Will anyone else be joining your video visit? No    Assessment & Plan     Anxiety  - Mental health referral placed. Refilled alprazolam, PDMP reviewed and appropriate. Discussed starting controller medication, Matthew declines at this time. Patient will follow up with PCP.   - ALPRAZolam (XANAX) 2 MG tablet  Dispense: 30 tablet; Refill: 0  - Adult Mental Health  Referral    Grief reaction  - Mental health referral placed for grief counseling. Refill of alprazolam sent.  - ALPRAZolam (XANAX) 2 MG tablet  Dispense: 30 tablet; Refill: 0  - Adult Mental Health  Referral    Chronic cough  - Patient reports cough and chest tightness upon awakening, suspects related to smoking. Encouraged smoking cessation. Trial inhaler PRN.  - albuterol (PROAIR HFA/PROVENTIL HFA/VENTOLIN HFA) 108 (90 Base) MCG/ACT inhaler  Dispense: 18 g; Refill: 0    Allergic rhinitis due to pollen, unspecified seasonality  - Refill sent to pharmacy.  - fluticasone (FLONASE) 50 MCG/ACT nasal spray  Dispense: 16 g; Refill: 3    Screen for colon cancer  - Colonoscopy Screening  Referral    Nicotine/Tobacco Cessation  He reports that he has been smoking cigarettes. He has a 9.5 pack-year smoking history. He has never used smokeless tobacco.  Nicotine/Tobacco Cessation Plan  Information offered: Patient not interested at this time, but is contemplating quitting. Will discuss with his PCP at upcoming appointment.     Return to care if symptoms worsen or fail to improve. Appointment with PCP scheduled.     Subjective   Matthew is a 54 year old, presenting for the following health issues:  Anxiety    Video Start Time:  5:15 PM    Anxiety    History of Present Illness       Mental Health Follow-up:  Patient presents  to follow-up on Anxiety.    Patient's anxiety since last visit has been:  No change  The patient is not having other symptoms associated with anxiety.  Any significant life events: grief or loss  Patient is not feeling anxious or having panic attacks.  Patient has no concerns about alcohol or drug use.    He eats 2-3 servings of fruits and vegetables daily.He consumes 4 sweetened beverage(s) daily.He exercises with enough effort to increase his heart rate 9 or less minutes per day.  He exercises with enough effort to increase his heart rate 3 or less days per week.   He is taking medications regularly.      Matthew reports that his wife passed away in December. He has been having a lot of symptoms of anxiety, including worry, restlessness, shaking, feeling on edge. Fortunately, he does feel that these symptoms have been improving somewhat. Does have insomnia at baseline due to night shift- trouble falling and staying asleep. He is interested in seeing a therapist for grief and his anxiety. Denies current symptoms of depression.  Alprazolam has been helpful for symptoms, PDMP reviewed and shows 1 refill a month ago, previously refilled in April 2024. He is not on a controller medication and does not feel that this is necessary, reports that he feels his symptoms have been improving and he would rather not take a daily medication.     Review of Systems  Constitutional, HEENT, cardiovascular, pulmonary, gi and gu systems are negative, except as otherwise noted.      Objective       Vitals:  No vitals were obtained today due to virtual visit.    Physical Exam   GENERAL: alert and no distress  EYES: Eyes grossly normal to inspection.  No discharge or erythema, or obvious scleral/conjunctival abnormalities.  RESP: No audible wheeze, cough, or visible cyanosis.    SKIN: Visible skin clear. No significant rash, abnormal pigmentation or lesions.  NEURO: Cranial nerves grossly intact.  Mentation and speech appropriate for  age.  PSYCH: Appropriate affect, tone, and pace of words      Video-Visit Details    Type of service:  Video Visit   Video End Time: 5:24 PM  Originating Location (pt. Location): Parked car    Distant Location (provider location):  On-site  Platform used for Video Visit: Leann    Signed Electronically by: LEOBARDO Willard CNP

## 2025-04-23 ASSESSMENT — ANXIETY QUESTIONNAIRES
GAD7 TOTAL SCORE: 4
7. FEELING AFRAID AS IF SOMETHING AWFUL MIGHT HAPPEN: NOT AT ALL
7. FEELING AFRAID AS IF SOMETHING AWFUL MIGHT HAPPEN: NOT AT ALL
IF YOU CHECKED OFF ANY PROBLEMS ON THIS QUESTIONNAIRE, HOW DIFFICULT HAVE THESE PROBLEMS MADE IT FOR YOU TO DO YOUR WORK, TAKE CARE OF THINGS AT HOME, OR GET ALONG WITH OTHER PEOPLE: SOMEWHAT DIFFICULT
4. TROUBLE RELAXING: SEVERAL DAYS
2. NOT BEING ABLE TO STOP OR CONTROL WORRYING: SEVERAL DAYS
GAD7 TOTAL SCORE: 4
5. BEING SO RESTLESS THAT IT IS HARD TO SIT STILL: NOT AT ALL
1. FEELING NERVOUS, ANXIOUS, OR ON EDGE: SEVERAL DAYS
6. BECOMING EASILY ANNOYED OR IRRITABLE: NOT AT ALL
GAD7 TOTAL SCORE: 4
8. IF YOU CHECKED OFF ANY PROBLEMS, HOW DIFFICULT HAVE THESE MADE IT FOR YOU TO DO YOUR WORK, TAKE CARE OF THINGS AT HOME, OR GET ALONG WITH OTHER PEOPLE?: SOMEWHAT DIFFICULT
3. WORRYING TOO MUCH ABOUT DIFFERENT THINGS: SEVERAL DAYS

## 2025-04-24 ENCOUNTER — VIRTUAL VISIT (OUTPATIENT)
Dept: FAMILY MEDICINE | Facility: CLINIC | Age: 55
End: 2025-04-24
Payer: COMMERCIAL

## 2025-04-24 DIAGNOSIS — F41.9 ANXIETY: ICD-10-CM

## 2025-04-24 DIAGNOSIS — J30.1 ALLERGIC RHINITIS DUE TO POLLEN, UNSPECIFIED SEASONALITY: ICD-10-CM

## 2025-04-24 DIAGNOSIS — R05.3 CHRONIC COUGH: ICD-10-CM

## 2025-04-24 DIAGNOSIS — F43.20 GRIEF REACTION: ICD-10-CM

## 2025-04-24 DIAGNOSIS — F17.200 TOBACCO USE DISORDER: Primary | ICD-10-CM

## 2025-04-24 RX ORDER — ALPRAZOLAM 2 MG/1
TABLET ORAL
Qty: 30 TABLET | Refills: 0 | Status: SHIPPED | OUTPATIENT
Start: 2025-04-24

## 2025-04-24 RX ORDER — ALBUTEROL SULFATE 90 UG/1
2 INHALANT RESPIRATORY (INHALATION) EVERY 6 HOURS PRN
Qty: 18 G | Refills: 0 | Status: SHIPPED | OUTPATIENT
Start: 2025-04-24

## 2025-04-24 RX ORDER — FLUTICASONE PROPIONATE 50 MCG
1 SPRAY, SUSPENSION (ML) NASAL 2 TIMES DAILY
Qty: 16 G | Refills: 3 | Status: SHIPPED | OUTPATIENT
Start: 2025-04-24

## 2025-04-24 NOTE — PROGRESS NOTES
Matthew is a 54 year old who is being evaluated via a billable video visit.    How would you like to obtain your AVS? MyChart  If the video visit is dropped, the invitation should be resent by: Text to cell phone: [phone]  Will anyone else be joining your video visit? No      Assessment & Plan   1. Anxiety  2. Grief reaction  Last refill of Xanax given.  If he requires further refills he must be on a controller medication in addition to decrease reliance on Benzodiazepine medication.  PDMP reviewed.  I am recommending other providers to see this note and will prescribe further refills and directed back to me.  - ALPRAZolam (XANAX) 2 MG tablet; Take 1/2 tablet 2 times a day as needed for anxiety. Should not need daily. Needs visit for future refills. Should not drive on this medication.  Dispense: 30 tablet; Refill: 0    3. Allergic rhinitis due to pollen, unspecified seasonality  4. Chronic cough  Suspect COPD.  Trial anticholinergic inhaler.  Would also consider long-acting beta agonist. Will likely be dictated by insurance coverage.  - fluticasone (FLONASE) 50 MCG/ACT nasal spray; Spray 1 spray into both nostrils 2 times daily.  Dispense: 16 g; Refill: 3  - albuterol (PROAIR HFA/PROVENTIL HFA/VENTOLIN HFA) 108 (90 Base) MCG/ACT inhaler; Inhale 2 puffs into the lungs every 6 hours as needed for wheezing or cough.  Dispense: 18 g; Refill: 0  - umeclidinium (INCRUSE ELLIPTA) 62.5 MCG/ACT inhaler; Inhale 1 puff into the lungs daily.  Dispense: 90 each; Refill: 3    5. Tobacco use disorder (Primary)  Encourage continued cessation.  - umeclidinium (INCRUSE ELLIPTA) 62.5 MCG/ACT inhaler; Inhale 1 puff into the lungs daily.  Dispense: 90 each; Refill: 3    Amanuel Connor MD  [facility]    Disclaimer: This note consists of symbols derived from keyboarding, dictation and/or voice recognition software. As a result, there may be errors in the script that have gone undetected. Please  consider this when interpreting information found in this chart.    The longitudinal plan of care for the diagnosis(es)/condition(s) as documented were addressed during this visit. Due to the added complexity in care, I will continue to support Matthew in the subsequent management and with ongoing continuity of care.    Subjective   Matthew is a 54 year old, presenting for the following health issues:  Recheck Medication        4/24/2025     1:10 PM   Additional Questions   Roomed by Pretty   Accompanied by Self     Video Start Time: 1:35 PM    History of Present Illness       COPD:  He presents for follow up of COPD.  Overall, COPD symptoms are stable since last visit.  He has same as usual fatigue or shortness of breath with exertion and same as usual shortness of breath at rest.  He sometimes coughs and does not have change in sputum. No recent fever. He can walk less than a mile without stopping to rest. He can walk 3 or more flights of stairs without resting. The patient has had no ED, urgent care, or hospital admissions because of COPD since the last visit.     Mental Health Follow-up:  Patient presents to follow-up on Anxiety.    Patient's anxiety since last visit has been:  No change  The patient is having other symptoms associated with anxiety.  Any significant life events: job concerns, financial concerns, housing concerns, grief or loss and health concerns  Patient is not feeling anxious or having panic attacks.  Patient has no concerns about alcohol or drug use.    He eats 0-1 servings of fruits and vegetables daily.He consumes 3 sweetened beverage(s) daily.He exercises with enough effort to increase his heart rate 9 or less minutes per day.  He exercises with enough effort to increase his heart rate 3 or less days per week.   He is taking medications regularly.        Medication Followup of Xanax  Taking Medication as prescribed: yes  Side Effects:  None  Medication Helping Symptoms:  yes    Working on  smoking cessation.  Has not smoked since Sunday.  Is current using the patches to quit plan pack count.  He would be interested in a longer lasting inhaler to try to help with his breathing.  He has continued to use settings.  He did get 1 additional fill since the last.  I did discuss seeing 1 provider for this and that this would be his last refill.    Review of Systems  Constitutional, HEENT, cardiovascular, pulmonary, GI, , musculoskeletal, neuro, skin, endocrine and psych systems are negative, except as otherwise noted.      Objective         Vitals:  No vitals were obtained today due to virtual visit.    Physical Exam   GENERAL: alert and no distress  EYES: Eyes grossly normal to inspection.  No discharge or erythema, or obvious scleral/conjunctival abnormalities.  RESP: No audible wheeze, cough, or visible cyanosis.    SKIN: Visible skin clear. No significant rash, abnormal pigmentation or lesions.  NEURO: Cranial nerves grossly intact.  Mentation and speech appropriate for age.  PSYCH: Appropriate affect, tone, and pace of words    Labs: None      Video-Visit Details    Type of service:  Video Visit   Video End Time:1:42 PM  Originating Location (pt. Location): Home    Distant Location (provider location):  On-site  Platform used for Video Visit: Leann  Signed Electronically by: Amanuel Connor MD

## 2025-06-26 ENCOUNTER — MYC MEDICAL ADVICE (OUTPATIENT)
Dept: FAMILY MEDICINE | Facility: CLINIC | Age: 55
End: 2025-06-26

## 2025-06-26 ENCOUNTER — E-VISIT (OUTPATIENT)
Dept: FAMILY MEDICINE | Facility: CLINIC | Age: 55
End: 2025-06-26
Payer: COMMERCIAL

## 2025-06-26 DIAGNOSIS — F41.9 ANXIETY: ICD-10-CM

## 2025-06-26 DIAGNOSIS — F43.20 GRIEF REACTION: ICD-10-CM

## 2025-06-26 DIAGNOSIS — R69 DIAGNOSIS UNKNOWN: Primary | ICD-10-CM

## 2025-06-26 RX ORDER — ALPRAZOLAM 2 MG/1
TABLET ORAL
Qty: 15 TABLET | Refills: 0 | Status: SHIPPED | OUTPATIENT
Start: 2025-06-26

## 2025-06-26 ASSESSMENT — PATIENT HEALTH QUESTIONNAIRE - PHQ9
SUM OF ALL RESPONSES TO PHQ QUESTIONS 1-9: 6
SUM OF ALL RESPONSES TO PHQ QUESTIONS 1-9: 6
10. IF YOU CHECKED OFF ANY PROBLEMS, HOW DIFFICULT HAVE THESE PROBLEMS MADE IT FOR YOU TO DO YOUR WORK, TAKE CARE OF THINGS AT HOME, OR GET ALONG WITH OTHER PEOPLE: NOT DIFFICULT AT ALL

## 2025-06-26 ASSESSMENT — ANXIETY QUESTIONNAIRES
2. NOT BEING ABLE TO STOP OR CONTROL WORRYING: NOT AT ALL
3. WORRYING TOO MUCH ABOUT DIFFERENT THINGS: SEVERAL DAYS
GAD7 TOTAL SCORE: 4
8. IF YOU CHECKED OFF ANY PROBLEMS, HOW DIFFICULT HAVE THESE MADE IT FOR YOU TO DO YOUR WORK, TAKE CARE OF THINGS AT HOME, OR GET ALONG WITH OTHER PEOPLE?: NOT DIFFICULT AT ALL
7. FEELING AFRAID AS IF SOMETHING AWFUL MIGHT HAPPEN: NOT AT ALL
IF YOU CHECKED OFF ANY PROBLEMS ON THIS QUESTIONNAIRE, HOW DIFFICULT HAVE THESE PROBLEMS MADE IT FOR YOU TO DO YOUR WORK, TAKE CARE OF THINGS AT HOME, OR GET ALONG WITH OTHER PEOPLE: NOT DIFFICULT AT ALL
7. FEELING AFRAID AS IF SOMETHING AWFUL MIGHT HAPPEN: NOT AT ALL
6. BECOMING EASILY ANNOYED OR IRRITABLE: SEVERAL DAYS
4. TROUBLE RELAXING: SEVERAL DAYS
GAD7 TOTAL SCORE: 4
1. FEELING NERVOUS, ANXIOUS, OR ON EDGE: SEVERAL DAYS
5. BEING SO RESTLESS THAT IT IS HARD TO SIT STILL: NOT AT ALL
GAD7 TOTAL SCORE: 4

## 2025-06-26 NOTE — PATIENT INSTRUCTIONS
Dear Matthew,    We are sorry you are not feeling well. Based on the responses you provided, it is recommended that you follow up with Dr. Tobin Byrnes for better continuity of care. Please schedule this visit in [email or web address]. You will have a Schedule Now button in [employer] to help with scheduling this appointment. Otherwise, you can call [phone] to schedule an appointment.     You will not be charged for this eVisit. Thank you for trusting us with your care.     LEOBARDO Willard CNP

## 2025-06-26 NOTE — TELEPHONE ENCOUNTER
Reviewed visit history. Anxiety management needs to go through PCP.     Provider E-Visit time total (minutes): Referred to in person/virtual visit    LEOBARDO Willard CNP

## 2025-06-26 NOTE — TELEPHONE ENCOUNTER
Last VV 5/23/25    Requesting refill     For dentist appointment, anxiety with dentist. No appointment scheduled.    Last refill xanax 5/23/25    Ara Flores RN  [facility] - Registered Nurse  Clinic Triage [place]   June 26, 2025

## 2025-06-30 ENCOUNTER — PATIENT OUTREACH (OUTPATIENT)
Dept: CARE COORDINATION | Facility: CLINIC | Age: 55
End: 2025-06-30
Payer: COMMERCIAL

## 2025-08-06 ASSESSMENT — ANXIETY QUESTIONNAIRES
4. TROUBLE RELAXING: SEVERAL DAYS
6. BECOMING EASILY ANNOYED OR IRRITABLE: NOT AT ALL
1. FEELING NERVOUS, ANXIOUS, OR ON EDGE: MORE THAN HALF THE DAYS
3. WORRYING TOO MUCH ABOUT DIFFERENT THINGS: SEVERAL DAYS
GAD7 TOTAL SCORE: 6
GAD7 TOTAL SCORE: 6
7. FEELING AFRAID AS IF SOMETHING AWFUL MIGHT HAPPEN: NOT AT ALL
GAD7 TOTAL SCORE: 6
IF YOU CHECKED OFF ANY PROBLEMS ON THIS QUESTIONNAIRE, HOW DIFFICULT HAVE THESE PROBLEMS MADE IT FOR YOU TO DO YOUR WORK, TAKE CARE OF THINGS AT HOME, OR GET ALONG WITH OTHER PEOPLE: SOMEWHAT DIFFICULT
2. NOT BEING ABLE TO STOP OR CONTROL WORRYING: SEVERAL DAYS
5. BEING SO RESTLESS THAT IT IS HARD TO SIT STILL: SEVERAL DAYS
8. IF YOU CHECKED OFF ANY PROBLEMS, HOW DIFFICULT HAVE THESE MADE IT FOR YOU TO DO YOUR WORK, TAKE CARE OF THINGS AT HOME, OR GET ALONG WITH OTHER PEOPLE?: SOMEWHAT DIFFICULT
7. FEELING AFRAID AS IF SOMETHING AWFUL MIGHT HAPPEN: NOT AT ALL

## 2025-08-07 ENCOUNTER — VIRTUAL VISIT (OUTPATIENT)
Dept: FAMILY MEDICINE | Facility: CLINIC | Age: 55
End: 2025-08-07
Payer: COMMERCIAL

## 2025-08-07 DIAGNOSIS — F17.200 TOBACCO USE DISORDER: Primary | ICD-10-CM

## 2025-08-07 DIAGNOSIS — F43.20 GRIEF REACTION: ICD-10-CM

## 2025-08-07 DIAGNOSIS — F41.9 ANXIETY: ICD-10-CM

## 2025-08-07 RX ORDER — ALPRAZOLAM 2 MG/1
TABLET ORAL
Qty: 15 TABLET | Refills: 0 | Status: SHIPPED | OUTPATIENT
Start: 2025-08-07

## 2025-08-07 RX ORDER — NICOTINE 21 MG/24HR
1 PATCH, TRANSDERMAL 24 HOURS TRANSDERMAL EVERY 24 HOURS
Qty: 14 PATCH | Refills: 0 | Status: SHIPPED | OUTPATIENT
Start: 2025-09-24 | End: 2025-10-08

## 2025-08-07 RX ORDER — NICOTINE 21 MG/24HR
1 PATCH, TRANSDERMAL 24 HOURS TRANSDERMAL EVERY 24 HOURS
Qty: 48 PATCH | Refills: 0 | Status: SHIPPED | OUTPATIENT
Start: 2025-08-07 | End: 2025-09-24

## 2025-08-07 ASSESSMENT — PATIENT HEALTH QUESTIONNAIRE - PHQ9
SUM OF ALL RESPONSES TO PHQ QUESTIONS 1-9: 4
10. IF YOU CHECKED OFF ANY PROBLEMS, HOW DIFFICULT HAVE THESE PROBLEMS MADE IT FOR YOU TO DO YOUR WORK, TAKE CARE OF THINGS AT HOME, OR GET ALONG WITH OTHER PEOPLE: SOMEWHAT DIFFICULT
SUM OF ALL RESPONSES TO PHQ QUESTIONS 1-9: 4

## 2025-08-11 ENCOUNTER — PATIENT OUTREACH (OUTPATIENT)
Dept: CARE COORDINATION | Facility: CLINIC | Age: 55
End: 2025-08-11
Payer: COMMERCIAL

## 2025-08-19 ENCOUNTER — MYC MEDICAL ADVICE (OUTPATIENT)
Dept: FAMILY MEDICINE | Facility: CLINIC | Age: 55
End: 2025-08-19
Payer: COMMERCIAL

## 2025-08-19 DIAGNOSIS — F43.20 GRIEF REACTION: ICD-10-CM

## 2025-08-19 DIAGNOSIS — F41.9 ANXIETY: ICD-10-CM

## 2025-08-20 RX ORDER — ALPRAZOLAM 2 MG/1
TABLET ORAL
Qty: 15 TABLET | Refills: 0 | Status: SHIPPED | OUTPATIENT
Start: 2025-08-25

## 2025-09-03 ENCOUNTER — MYC MEDICAL ADVICE (OUTPATIENT)
Dept: FAMILY MEDICINE | Facility: CLINIC | Age: 55
End: 2025-09-03
Payer: COMMERCIAL

## 2025-09-03 DIAGNOSIS — F41.9 ANXIETY: ICD-10-CM

## 2025-09-03 DIAGNOSIS — F43.20 GRIEF REACTION: ICD-10-CM

## 2025-09-03 RX ORDER — ALPRAZOLAM 2 MG/1
TABLET ORAL
Qty: 15 TABLET | Refills: 0 | Status: SHIPPED | OUTPATIENT
Start: 2025-09-20